# Patient Record
Sex: MALE | Race: WHITE | Employment: OTHER | URBAN - METROPOLITAN AREA
[De-identification: names, ages, dates, MRNs, and addresses within clinical notes are randomized per-mention and may not be internally consistent; named-entity substitution may affect disease eponyms.]

---

## 2017-08-29 PROBLEM — C44.319 BASAL CELL CARCINOMA OF RIGHT LATERAL CHEEK: Status: ACTIVE | Noted: 2017-08-29

## 2022-04-26 ENCOUNTER — HOSPITAL ENCOUNTER (OUTPATIENT)
Age: 87
Setting detail: SPECIMEN
Discharge: HOME | End: 2022-04-26

## 2022-04-26 ENCOUNTER — HOSPITAL ENCOUNTER (OUTPATIENT)
Dept: PREADMISSION TESTING | Age: 87
Discharge: HOME | End: 2022-04-30
Payer: MEDICARE

## 2022-04-26 VITALS
HEART RATE: 65 BPM | SYSTOLIC BLOOD PRESSURE: 137 MMHG | OXYGEN SATURATION: 94 % | DIASTOLIC BLOOD PRESSURE: 107 MMHG | TEMPERATURE: 97 F | RESPIRATION RATE: 18 BRPM

## 2022-04-26 DIAGNOSIS — Z01.818 PRE-OP TESTING: Primary | ICD-10-CM

## 2022-04-26 DIAGNOSIS — Z01.818 PRE-OP TESTING: ICD-10-CM

## 2022-04-26 PROBLEM — N18.30 STAGE 3 CHRONIC KIDNEY DISEASE (HCC): Status: ACTIVE | Noted: 2020-11-20

## 2022-04-26 PROBLEM — E13.9 LATENT AUTOIMMUNE DIABETES MELLITUS IN ADULT (LADA) (HCC): Status: ACTIVE | Noted: 2022-04-26

## 2022-04-26 PROBLEM — I27.20 PULMONARY HYPERTENSION (HCC): Status: ACTIVE | Noted: 2020-11-20

## 2022-04-26 PROBLEM — E78.2 MIXED HYPERLIPIDEMIA: Status: ACTIVE | Noted: 2020-11-20

## 2022-04-26 PROBLEM — Z95.2 HISTORY OF MITRAL VALVE REPLACEMENT: Status: ACTIVE | Noted: 2020-11-20

## 2022-04-26 PROBLEM — M19.90 OSTEOARTHROSIS: Status: ACTIVE | Noted: 2020-11-20

## 2022-04-26 PROBLEM — F17.200 NICOTINE DEPENDENCE: Status: ACTIVE | Noted: 2020-11-20

## 2022-04-26 PROBLEM — I38 HEART VALVE DISEASE: Status: ACTIVE | Noted: 2017-12-08

## 2022-04-26 PROBLEM — I34.0 NONRHEUMATIC MITRAL VALVE INSUFFICIENCY: Status: ACTIVE | Noted: 2017-04-04

## 2022-04-26 PROBLEM — I50.30 DIASTOLIC HEART FAILURE (HCC): Status: ACTIVE | Noted: 2020-11-20

## 2022-04-26 PROBLEM — K21.9 GASTRO-ESOPHAGEAL REFLUX DISEASE WITHOUT ESOPHAGITIS: Status: ACTIVE | Noted: 2020-11-20

## 2022-04-26 PROBLEM — I48.19 PERSISTENT ATRIAL FIBRILLATION (HCC): Status: ACTIVE | Noted: 2020-11-20

## 2022-04-26 PROBLEM — E11.9 TYPE 2 DIABETES MELLITUS (HCC): Status: ACTIVE | Noted: 2017-03-14

## 2022-04-26 PROBLEM — B37.42 CANDIDAL BALANITIS: Status: ACTIVE | Noted: 2020-11-20

## 2022-04-26 PROBLEM — N40.0 ENLARGED PROSTATE: Status: ACTIVE | Noted: 2020-11-20

## 2022-04-26 PROBLEM — R06.02 SHORTNESS OF BREATH: Status: ACTIVE | Noted: 2017-03-14

## 2022-04-26 PROBLEM — E55.9 VITAMIN D DEFICIENCY: Status: ACTIVE | Noted: 2020-11-20

## 2022-04-26 PROBLEM — I65.29 CAROTID STENOSIS: Status: ACTIVE | Noted: 2018-03-12

## 2022-04-26 PROBLEM — I36.1 NON-RHEUMATIC TRICUSPID VALVE INSUFFICIENCY: Status: ACTIVE | Noted: 2017-03-14

## 2022-04-26 PROBLEM — J90 PLEURAL EFFUSION: Status: ACTIVE | Noted: 2020-04-13

## 2022-04-26 PROBLEM — D64.9 ANEMIA: Status: ACTIVE | Noted: 2020-11-20

## 2022-04-26 PROBLEM — I49.5 SINUS NODE DYSFUNCTION (HCC): Status: ACTIVE | Noted: 2017-02-06

## 2022-04-26 PROBLEM — G47.33 OBSTRUCTIVE SLEEP APNEA SYNDROME: Status: ACTIVE | Noted: 2020-11-20

## 2022-04-26 PROBLEM — J44.9 CHRONIC OBSTRUCTIVE PULMONARY DISEASE (HCC): Status: ACTIVE | Noted: 2017-03-14

## 2022-04-26 PROBLEM — E03.9 HYPOTHYROIDISM: Status: ACTIVE | Noted: 2020-11-20

## 2022-04-26 LAB
ANION GAP SERPL CALCULATED.3IONS-SCNC: 19 MMOL/L (ref 9–17)
BUN BLDV-MCNC: 61 MG/DL (ref 8–23)
CALCIUM SERPL-MCNC: 9.5 MG/DL (ref 8.6–10.4)
CHLORIDE BLD-SCNC: 99 MMOL/L (ref 98–107)
CO2: 22 MMOL/L (ref 20–31)
CREAT SERPL-MCNC: 2.83 MG/DL (ref 0.7–1.2)
GFR AFRICAN AMERICAN: 26 ML/MIN
GFR NON-AFRICAN AMERICAN: 21 ML/MIN
GFR SERPL CREATININE-BSD FRML MDRD: ABNORMAL ML/MIN/{1.73_M2}
GLUCOSE BLD-MCNC: 105 MG/DL (ref 70–99)
HCT VFR BLD CALC: 34.5 % (ref 40.7–50.3)
HEMOGLOBIN: 11 G/DL (ref 13–17)
MCH RBC QN AUTO: 34.4 PG (ref 25.2–33.5)
MCHC RBC AUTO-ENTMCNC: 31.9 G/DL (ref 28.4–34.8)
MCV RBC AUTO: 107.8 FL (ref 82.6–102.9)
NRBC AUTOMATED: 0 PER 100 WBC
PDW BLD-RTO: 14.4 % (ref 11.8–14.4)
PLATELET # BLD: 177 K/UL (ref 138–453)
PMV BLD AUTO: 11.4 FL (ref 8.1–13.5)
POTASSIUM SERPL-SCNC: 4.3 MMOL/L (ref 3.7–5.3)
RBC # BLD: 3.2 M/UL (ref 4.21–5.77)
SODIUM BLD-SCNC: 140 MMOL/L (ref 135–144)
WBC # BLD: 7.5 K/UL (ref 3.5–11.3)

## 2022-04-26 PROCEDURE — 93005 ELECTROCARDIOGRAM TRACING: CPT | Performed by: ANESTHESIOLOGY

## 2022-04-27 LAB
EKG ATRIAL RATE: 66 BPM
EKG Q-T INTERVAL: 476 MS
EKG QRS DURATION: 190 MS
EKG QTC CALCULATION (BAZETT): 483 MS
EKG R AXIS: -96 DEGREES
EKG T AXIS: 77 DEGREES
EKG VENTRICULAR RATE: 62 BPM

## 2022-06-08 ENCOUNTER — ANESTHESIA EVENT (OUTPATIENT)
Dept: OPERATING ROOM | Age: 87
End: 2022-06-08
Payer: MEDICARE

## 2022-06-08 NOTE — PRE-PROCEDURE INSTRUCTIONS
PAT phone call completed with pt. Pt seems confused about names of medications and very [place]. Communication difficult over the phone with pt. Date/time/location of surgery verified with pt. NPO after [place] status verified with pt. Need for  (son to drop off and sister to )  verified with pt. Instructed pt to take BP meds (metoprolol) with a small sip of water prior to procedure/surgery. Pt verbalized understanding to hold Coumadin starting 6/11/22. Pt has Cardiology/Pacemaker interrogation scheduled for this week.

## 2022-06-14 ENCOUNTER — HOSPITAL ENCOUNTER (OUTPATIENT)
Age: 87
Setting detail: OUTPATIENT SURGERY
Discharge: HOME | End: 2022-06-14
Attending: PLASTIC SURGERY | Admitting: PLASTIC SURGERY
Payer: MEDICARE

## 2022-06-14 ENCOUNTER — ANESTHESIA (OUTPATIENT)
Dept: OPERATING ROOM | Age: 87
End: 2022-06-14
Payer: MEDICARE

## 2022-06-14 VITALS
RESPIRATION RATE: 17 BRPM | WEIGHT: 152 LBS | BODY MASS INDEX: 21.28 KG/M2 | HEART RATE: 69 BPM | OXYGEN SATURATION: 96 % | SYSTOLIC BLOOD PRESSURE: 123 MMHG | TEMPERATURE: 97.4 F | DIASTOLIC BLOOD PRESSURE: 97 MMHG | HEIGHT: 71 IN

## 2022-06-14 DIAGNOSIS — D48.5 NEOPLASM OF UNCERTAIN BEHAVIOR OF SKIN: ICD-10-CM

## 2022-06-14 PROCEDURE — 3600000012 HC SURGERY LEVEL 2 ADDTL 15MIN: Performed by: PLASTIC SURGERY

## 2022-06-14 PROCEDURE — 3600000002 HC SURGERY LEVEL 2 BASE: Performed by: PLASTIC SURGERY

## 2022-06-14 PROCEDURE — 7100000011 HC PHASE II RECOVERY - ADDTL 15 MIN: Performed by: PLASTIC SURGERY

## 2022-06-14 PROCEDURE — 2709999900 HC NON-CHARGEABLE SUPPLY: Performed by: PLASTIC SURGERY

## 2022-06-14 PROCEDURE — 6360000002 HC RX W HCPCS: Performed by: SPECIALIST

## 2022-06-14 PROCEDURE — 88305 TISSUE EXAM BY PATHOLOGIST: CPT

## 2022-06-14 PROCEDURE — 3700000001 HC ADD 15 MINUTES (ANESTHESIA): Performed by: PLASTIC SURGERY

## 2022-06-14 PROCEDURE — 7100000010 HC PHASE II RECOVERY - FIRST 15 MIN: Performed by: PLASTIC SURGERY

## 2022-06-14 PROCEDURE — 3700000000 HC ANESTHESIA ATTENDED CARE: Performed by: PLASTIC SURGERY

## 2022-06-14 PROCEDURE — 2580000003 HC RX 258: Performed by: ANESTHESIOLOGY

## 2022-06-14 PROCEDURE — 2500000003 HC RX 250 WO HCPCS: Performed by: PLASTIC SURGERY

## 2022-06-14 RX ORDER — SODIUM CHLORIDE 9 MG/ML
25 INJECTION, SOLUTION INTRAVENOUS PRN
Status: DISCONTINUED | OUTPATIENT
Start: 2022-06-14 | End: 2022-06-14 | Stop reason: HOSPADM

## 2022-06-14 RX ORDER — CEFAZOLIN SODIUM 2 G/50ML
SOLUTION INTRAVENOUS PRN
Status: DISCONTINUED | OUTPATIENT
Start: 2022-06-14 | End: 2022-06-14 | Stop reason: SDUPTHER

## 2022-06-14 RX ORDER — SODIUM CHLORIDE 0.9 % (FLUSH) 0.9 %
5-40 SYRINGE (ML) INJECTION EVERY 12 HOURS SCHEDULED
Status: DISCONTINUED | OUTPATIENT
Start: 2022-06-14 | End: 2022-06-14 | Stop reason: HOSPADM

## 2022-06-14 RX ORDER — CEPHALEXIN 500 MG/1
500 CAPSULE ORAL 3 TIMES DAILY
Qty: 15 CAPSULE | Refills: 0 | Status: SHIPPED | OUTPATIENT
Start: 2022-06-14 | End: 2022-06-19

## 2022-06-14 RX ORDER — SODIUM CHLORIDE, SODIUM LACTATE, POTASSIUM CHLORIDE, CALCIUM CHLORIDE 600; 310; 30; 20 MG/100ML; MG/100ML; MG/100ML; MG/100ML
INJECTION, SOLUTION INTRAVENOUS CONTINUOUS
Status: DISCONTINUED | OUTPATIENT
Start: 2022-06-14 | End: 2022-06-14 | Stop reason: HOSPADM

## 2022-06-14 RX ORDER — SODIUM CHLORIDE 0.9 % (FLUSH) 0.9 %
5-40 SYRINGE (ML) INJECTION PRN
Status: DISCONTINUED | OUTPATIENT
Start: 2022-06-14 | End: 2022-06-14 | Stop reason: HOSPADM

## 2022-06-14 RX ORDER — SODIUM CHLORIDE 9 MG/ML
INJECTION, SOLUTION INTRAVENOUS CONTINUOUS
Status: DISCONTINUED | OUTPATIENT
Start: 2022-06-14 | End: 2022-06-14 | Stop reason: HOSPADM

## 2022-06-14 RX ORDER — BUPIVACAINE HYDROCHLORIDE AND EPINEPHRINE 5; 5 MG/ML; UG/ML
INJECTION, SOLUTION EPIDURAL; INTRACAUDAL; PERINEURAL PRN
Status: DISCONTINUED | OUTPATIENT
Start: 2022-06-14 | End: 2022-06-14 | Stop reason: ALTCHOICE

## 2022-06-14 RX ORDER — DIPHENHYDRAMINE HYDROCHLORIDE 50 MG/ML
12.5 INJECTION INTRAMUSCULAR; INTRAVENOUS
Status: DISCONTINUED | OUTPATIENT
Start: 2022-06-14 | End: 2022-06-14 | Stop reason: HOSPADM

## 2022-06-14 RX ORDER — FENTANYL CITRATE 50 UG/ML
INJECTION, SOLUTION INTRAMUSCULAR; INTRAVENOUS PRN
Status: DISCONTINUED | OUTPATIENT
Start: 2022-06-14 | End: 2022-06-14 | Stop reason: SDUPTHER

## 2022-06-14 RX ORDER — MORPHINE SULFATE 1 MG/ML
1 INJECTION, SOLUTION EPIDURAL; INTRATHECAL; INTRAVENOUS EVERY 5 MIN PRN
Status: DISCONTINUED | OUTPATIENT
Start: 2022-06-14 | End: 2022-06-14 | Stop reason: HOSPADM

## 2022-06-14 RX ORDER — ONDANSETRON 2 MG/ML
4 INJECTION INTRAMUSCULAR; INTRAVENOUS
Status: DISCONTINUED | OUTPATIENT
Start: 2022-06-14 | End: 2022-06-14 | Stop reason: HOSPADM

## 2022-06-14 RX ORDER — PROPOFOL 10 MG/ML
INJECTION, EMULSION INTRAVENOUS PRN
Status: DISCONTINUED | OUTPATIENT
Start: 2022-06-14 | End: 2022-06-14 | Stop reason: SDUPTHER

## 2022-06-14 RX ORDER — SODIUM CHLORIDE 9 MG/ML
INJECTION, SOLUTION INTRAVENOUS PRN
Status: DISCONTINUED | OUTPATIENT
Start: 2022-06-14 | End: 2022-06-14 | Stop reason: HOSPADM

## 2022-06-14 RX ADMIN — PROPOFOL 30 MG: 10 INJECTION, EMULSION INTRAVENOUS at 08:52

## 2022-06-14 RX ADMIN — CEFAZOLIN SODIUM 2000 MG: 2 SOLUTION INTRAVENOUS at 08:54

## 2022-06-14 RX ADMIN — SODIUM CHLORIDE: 9 INJECTION, SOLUTION INTRAVENOUS at 07:47

## 2022-06-14 RX ADMIN — FENTANYL CITRATE 25 MCG: 50 INJECTION, SOLUTION INTRAMUSCULAR; INTRAVENOUS at 08:49

## 2022-06-14 ASSESSMENT — COPD QUESTIONNAIRES: CAT_SEVERITY: NO INTERVAL CHANGE

## 2022-06-14 ASSESSMENT — ENCOUNTER SYMPTOMS: SHORTNESS OF BREATH: 1

## 2022-06-14 ASSESSMENT — PAIN SCALES - GENERAL
PAINLEVEL_OUTOF10: 0
PAINLEVEL_OUTOF10: 0

## 2022-06-14 ASSESSMENT — PAIN - FUNCTIONAL ASSESSMENT: PAIN_FUNCTIONAL_ASSESSMENT: 0-10

## 2022-06-14 NOTE — H&P
HPI: Pt is a 80 y.o. male who presents to the office today for a lesion on the dorsal right hand. He states that it has been present for a couple months and has become larger, more exophytic, pruritic and now has a waxy center. He states that it is painful at times and bleeds. He denies a history of skin cancer. His wife passed away three months ago and has been staying with his son in [place]. He noticed it was getting worse while he was there. 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      Past Medical History        Past Medical History:   Diagnosis Date    Anemia      Asthma      Atrial fibrillation [facility]      CAD (coronary artery disease)      Cancer ([facility] 08/18/2017     Right Srinivas Aaron Brain    COPD (chronic obstructive pulmonary disease) ([facility]      Diabetes ([facility]      [place] (hard of hearing)      Hypertension      Hypothyroidism      ERIC (obstructive sleep apnea)      Oxygen deficit       pt wears 2 liters of o2 at night.  and as needed            Past Surgical History         Past Surgical History:   Procedure Laterality Date    CARDIAC CATHETERIZATION   12/2018    CARDIAC VALVE SURGERY   02/2018     mitral valve replaced    COLONOSCOPY        HERNIA REPAIR        MALIGNANT SKIN LESION EXCISION Right 08/18/2017     BCC Right Cheek- Dr. Montiel [place]        PACEMAKER PLACEMENT        REVISION TOTAL KNEE ARTHROPLASTY        TONSILLECTOMY        TRICUSPID VALVE SURGERY         repair            Current Facility-Administered Medications          Current Outpatient Medications   Medication Sig Dispense Refill    cephALEXin (KEFLEX) 500 MG capsule          OXYGEN Inhale 2 L into the lungs as needed Per n/c        tamsulosin (FLOMAX) 0.4 MG capsule Take 0.4 mg by mouth daily        albuterol (PROVENTIL) 2.5 MG/0.5ML NEBU nebulizer solution Take 2.5 mg by nebulization every 6 hours as needed for Wheezing        tiotropium (SPIRIVA RESPIMAT) 1.25 MCG/ACT AERS inhaler Inhale 2 puffs into the lungs daily        torsemide (DEMADEX) 20 MG tablet Take 20 mg by mouth 2 times daily         Coenzyme Q10 (CO Q-10) 100 MG CAPS Take by mouth        Multiple Vitamins-Minerals (THERAPEUTIC MULTIVITAMIN-MINERALS) tablet Take 1 tablet by mouth daily        psyllium (KONSYL) 28.3 % PACK Take 1 packet by mouth daily        B Complex-C (SUPER B COMPLEX PO) Take by mouth        Cyanocobalamin (VITAMIN B 12) 500 MCG TABS Take 2,500 mg by mouth daily        levothyroxine (SYNTHROID) 125 MCG tablet          metoprolol tartrate (LOPRESSOR) 25 MG tablet Take 50 mg by mouth 2 times daily         omeprazole (PRILOSEC) 20 MG delayed release capsule          simvastatin (ZOCOR) 10 MG tablet          terazosin (HYTRIN) 2 MG capsule          warfarin (COUMADIN) 2 MG tablet          warfarin (COUMADIN) 5 MG tablet            No current facility-administered medications for this visit.            Family History         Family History   Problem Relation Age of Onset    Cancer Mother      No Known Problems Father              Social History               Socioeconomic History    Marital status:        Spouse name: Not on file    Number of children: Not on file    Years of education: Not on file    Highest education level: Not on file   Occupational History    Not on file   Tobacco Use    Smoking status: Never Smoker    Smokeless tobacco: Never Used   Vaping Use    Vaping Use: Never used   Substance and Sexual Activity    Alcohol use: Not Currently    Drug use: No    Sexual activity: Not on file   Other Topics Concern    Not on file   Social History Narrative    Not on file      Social Determinants of Health          Financial Resource Strain:     Difficulty of Paying Living Expenses: Not on file   Food Insecurity:     Worried About Running Out of Food in the Last Year: Not on file    [place] of Food in the Last Year: Not on file   Transportation Needs:     Lack of Transportation (Medical): Not on file    Lack of Transportation (Non-Medical): Not on file   Physical Activity:     Days of Exercise per Week: Not on file    Minutes of Exercise per Session: Not on file   Stress:     Feeling of Stress : Not on file   Social Connections:     Frequency of Communication with Friends and Family: Not on file    Frequency of Social Gatherings with Friends and Family: Not on file    Attends Druze Services: Not on file    Active Member of [address] [place] [address] or Organizations: Not on file    Attends Club or Organization Meetings: Not on file    Marital Status: Not on file   Intimate Partner Violence:     Fear of Current or Ex-Partner: Not on file    Emotionally Abused: Not on file    Physically Abused: Not on file    Sexually Abused: Not on file   Housing Stability:     Unable to Pay for Housing in the Last Year: Not on file    Number of [place] in the Last Year: Not on file    Unstable Housing in the Last Year: Not on file               ROS:  All systems reviewed. Denies chest pain, shortness of breath, abdominal pain, n/v/d/c. Denies rashes, any upper respiratory illness or fever / chills. Denies dizziness, headaches, tremor. Negative other than those noted above.           Allergies   Allergen Reactions    Atorvastatin Other (See Comments)       Other reaction(s): muscle cramps, Other: See Comments    Penicillins Rash    Entex T [Pseudoephedrine-Guaifenesin]           Physical Exam:  VITALS:  height is 5' 11\" (1.803 m) and weight is 180 lb (81.6 kg). His respiration is 16. CONSTITUTIONAL:alert & orientated x 3, no acute distress   SKIN:  Warm and dry, no rashes. 1.2 cm dome shaped, exophytic lesion to the dorsal right hand. See picture in media. See assessment below  HEAD:  Normocephalic, atraumatic  EYES: PERRL. EOMs intact. EARS:  Hearing grossly WNL. NOSE:  Nares patent.   No rhinorrhea  THROAT:  benign  NECK:supple, no lymphadenopathy  LUNGS: No audible adventitious lung sounds. Patient is breathing normally without issues speaking in full sentences. CARDIOVASCULAR: Heart sounds are normal. Pulses equal bilaterally. Skin color appropriate throughout. Regular rate and rhythm without murmur, gallop or rub. ABDOMEN: soft, non tender, non distended, no masses or organomegaly   EXTREMITIES: no edema bilateral lower extremities         Assessment:  1.     Diagnosis Orders   1. Neoplasm of uncertain behavior of skin of hand            Plan:  1. At this time we will get clearance from his PCP due to recent \"issues with urine\" per patient. We will schedule patient when cleared. 2. Self skin checks encouraged, patient advised to watch all existing lesions for changes and observe for any new lesions. Return to the office for evaluation of any concerning, changing or new skin lesions. 3.  Consistent use of sunscreen containing SPF 30 or higher and reapplication every 90 - 050 minutes while outside. 4.  The patient's was evaluated and after discussion surgical and non surgical options, the patient has decided to undergo surgical removal of the lesion. All questions were answered to the patient's satisfaction. We will get him scheduled for excision under MAC anesthesia. The patient will call Kriss to schedule the procedure. If there are any further questions or concerns, the patient was encouraged to call and follow up with one of the providers. H & P reviewed.  The Patient was examined and there are no changes to the H & P

## 2022-06-14 NOTE — OP NOTE
Operative Note      Patient: Nessa Morgan  YOB: 1934  MRN: [id number]    Date of Procedure: 6/14/2022    Pre-Op Diagnosis: D48.5  LESION RIGHT HAND    Post-Op Diagnosis: Same       Procedure(s):  HAND LESION BIOPSY EXCISION    Surgeon(s):  Peterson Yancey MD    Assistant:   * No surgical staff found *    Anesthesia: Monitor Anesthesia Care    Estimated Blood Loss (mL): Minimal    Complications: None    Specimens:   ID Type Source Tests Collected by Time Destination   A : LESION RIGHT HAND Tissue Lesion SURGICAL PATHOLOGY Dimitri Hampton RN 6/14/2022 0901        Implants:  * No implants in log *      Drains: * No LDAs found *    Findings: 22 mm dorsum right hand nodular lesion    Detailed Description of Procedure: With patient by intravenous sedation administered the areas anesthetized with local anesthetic 1/2% Marcaine 1-200,000 epinephrine local.  After sterile prep and drape and timeout oblique elliptical excision of this was carried out with Bovie electrocautery hemostasis. Interrupted and running 3-0 Prolene sutures were used to approximate skin with minimal bleeding. Sterile dressings were applied. He went to recovery in excellent condition.     Electronically signed by Peterson Yancey MD on 6/14/2022 at 9:16 AM

## 2022-06-14 NOTE — ANESTHESIA PRE PROCEDURE
Department of Anesthesiology  Preprocedure Note       Name:  Viktoriya Tyson   Age:  80 y.o.  :  1934                                          MRN:  [id number]         Date:  2022      Surgeon: Ros Bergman):  Toya Clemons MD    Procedure: Procedure(s):  HAND LESION BIOPSY EXCISION    Medications prior to admission:   Prior to Admission medications    Medication Sig Start Date End Date Taking?  Authorizing Provider   cephALEXin (KEFLEX) 500 MG capsule  22   Historical Provider, MD   OXYGEN Inhale 2 L into the lungs as needed Per n/c    Historical Provider, MD   tamsulosin (FLOMAX) 0.4 MG capsule Take 0.4 mg by mouth daily    Historical Provider, MD   albuterol (PROVENTIL) 2.5 MG/0.5ML NEBU nebulizer solution Take 2.5 mg by nebulization every 6 hours as needed for Wheezing    Historical Provider, MD   tiotropium (SPIRIVA RESPIMAT) 1.25 MCG/ACT AERS inhaler Inhale 2 puffs into the lungs daily    Historical Provider, MD   torsemide (DEMADEX) 20 MG tablet Take 20 mg by mouth 2 times daily     Historical Provider, MD   Coenzyme Q10 (CO Q-10) 100 MG CAPS Take by mouth    Historical Provider, MD   Multiple Vitamins-Minerals (THERAPEUTIC MULTIVITAMIN-MINERALS) tablet Take 1 tablet by mouth daily    Historical Provider, MD   psyllium (KONSYL) 28.3 % PACK Take 1 packet by mouth daily    Historical Provider, MD   B Complex-C (SUPER B COMPLEX PO) Take by mouth    Historical Provider, MD   Cyanocobalamin (VITAMIN B 12) 500 MCG TABS Take 2,500 mg by mouth daily    Historical Provider, MD   levothyroxine (SYNTHROID) 125 MCG tablet  17   Historical Provider, MD   metoprolol tartrate (LOPRESSOR) 25 MG tablet Take 50 mg by mouth 2 times daily  17   Historical Provider, MD   omeprazole (PRILOSEC) 20 MG delayed release capsule  17   Historical Provider, MD   simvastatin (ZOCOR) 10 MG tablet  17   Historical Provider, MD   terazosin (HYTRIN) 2 MG capsule  17   Historical Provider, MD   warfarin (COUMADIN) 2 MG tablet  7/5/17   Historical Provider, MD   warfarin (COUMADIN) 5 MG tablet  7/5/17   Historical Provider, MD       Current medications:    Current Facility-Administered Medications   Medication Dose Route Frequency Provider Last Rate Last Admin    0.9 % sodium chloride infusion   IntraVENous Continuous Moses Powell MD        lactated ringers infusion   IntraVENous Continuous Moses Powell MD        sodium chloride flush 0.9 % injection 5-40 mL  5-40 mL IntraVENous 2 times per day Moses Powell MD        sodium chloride flush 0.9 % injection 5-40 mL  5-40 mL IntraVENous PRN Moses Powell MD        0.9 % sodium chloride infusion   IntraVENous PRN Moses Powell MD           Allergies:     Allergies   Allergen Reactions    Atorvastatin Other (See Comments)     Other reaction(s): muscle cramps, Other: See Comments    Penicillins Rash    Entex T [Pseudoephedrine-Guaifenesin]        Problem List:    Patient Active Problem List   Diagnosis Code    Basal cell carcinoma of right lateral cheek C44.319    Anemia D64.9    Candidal balanitis B37.42    Carotid stenosis I65.29    Chronic obstructive pulmonary disease ([facility] Q12.0    Diastolic heart failure (HCC) I50.30    Enlarged prostate N40.0    Gastro-esophageal reflux disease without esophagitis K21.9    History of mitral valve replacement Z95.2    Hypertension I10    Hypothyroidism E03.9    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) ([facility] E13.9    Mixed hyperlipidemia E78.2    Nicotine dependence F17.200    Nonrheumatic mitral valve insufficiency I34.0    Non-rheumatic tricuspid valve insufficiency I36.1    Obstructive sleep apnea syndrome G47.33    Osteoarthrosis M19.90    Persistent atrial fibrillation (HCC) I48.19    Pleural effusion J90    Heart valve disease I38    Pulmonary hypertension (HCC) I27.20    Shortness of breath R06.02    Sinus node dysfunction (HCC) I49.5    Stage 3 chronic kidney disease (HCC) N18.30    Type 2 diabetes mellitus ([facility] E11.9    Vitamin D deficiency E55.9       Past Medical History:        Diagnosis Date    Anemia     Asthma     Atrial fibrillation (HCC)     CAD (coronary artery disease)     Cancer ([facility] 08/18/2017    Right Cheek BCC- Dr. Camila Guillaume COPD (chronic obstructive pulmonary disease) ([facility]     Diabetes ([facility]     [place] (hard of hearing)     Hypertension     Hypothyroidism     ERIC (obstructive sleep apnea)     Oxygen deficit     pt wears 2 liters of o2 at night. and as needed       Past Surgical History:        Procedure Laterality Date    CARDIAC CATHETERIZATION  12/2018   [facility]ARDIAC VALVE SURGERY  02/2018    mitral valve replaced    COLONOSCOPY      HERNIA REPAIR      MALIGNANT SKIN LESION EXCISION Right 08/18/2017    BCC Right Cheek- Dr. Norma Ordoñez      REVISION TOTAL KNEE ARTHROPLASTY      TONSILLECTOMY      TRICUSPID VALVE SURGERY      repair       Social History:    Social History     Tobacco Use    Smoking status: Never Smoker    Smokeless tobacco: Never Used   Substance Use Topics    Alcohol use: Not Currently                                Counseling given: Not Answered      Vital Signs (Current): There were no vitals filed for this visit.                                            BP Readings from Last 3 Encounters:   05/12/22 124/60   04/26/22 (!) 137/107   11/11/21 112/60       NPO Status:                                                                                 BMI:   Wt Readings from Last 3 Encounters:   05/12/22 147 lb 3.2 oz (66.8 kg)   04/26/22 180 lb (81.6 kg)   11/11/21 159 lb 12.8 oz (72.5 kg)     There is no height or weight on file to calculate BMI.    CBC:   Lab Results   Component Value Date    WBC 7.5 04/26/2022    RBC 3.20 04/26/2022    HGB 11.0 04/26/2022    HCT 34.5 04/26/2022    .8 04/26/2022    RDW 14.4 04/26/2022     04/26/2022       CMP:   Lab Results   Component Value Date     04/26/2022    K 4.3 04/26/2022    CL 99 04/26/2022    CO2 22 04/26/2022    BUN 61 04/26/2022    CREATININE 2.83 04/26/2022    GFRAA 26 04/26/2022    LABGLOM 21 04/26/2022    GLUCOSE 105 04/26/2022    CALCIUM 9.5 04/26/2022       POC Tests: No results for input(s): POCGLU, POCNA, POCK, POCCL, POCBUN, POCHEMO, POCHCT in the last 72 hours. Coags: No results found for: PROTIME, INR, APTT    HCG (If Applicable): No results found for: PREGTESTUR, PREGSERUM, HCG, HCGQUANT     ABGs: No results found for: PHART, PO2ART, NYL4CCX, DDQ1HQK, BEART, Y7IUPXHH     Type & Screen (If Applicable):  No results found for: LABABO, LABRH    Drug/Infectious Status (If Applicable):  No results found for: HIV, HEPCAB    COVID-19 Screening (If Applicable): No results found for: COVID19        Anesthesia Evaluation  Patient summary reviewed and Nursing notes reviewed no history of anesthetic complications:   Airway: Mallampati: II  TM distance: >3 FB   Neck ROM: full  Mouth opening: > = 3 FB   Dental:          Pulmonary:normal exam  breath sounds clear to auscultation  (+) COPD: no interval change,  shortness of breath:  sleep apnea:  asthma:                           ROS comment: Oxygen deficit pt wears 2 liters of o2 at night. and as needed    Cardiovascular:  Exercise tolerance: no interval change,   (+) hypertension: no interval change, pacemaker: pacemaker and no interval change, CAD: no interval change, dysrhythmias: atrial fibrillation, CHF: diastolic and no interval change, pulmonary hypertension: no interval change, hyperlipidemia      ECG reviewed  Rhythm: irregular  Rate: abnormal                 ROS comment: History of mitral valve replacement     Neuro/Psych:   Negative Neuro/Psych ROS              GI/Hepatic/Renal:   (+) GERD:,           Endo/Other:    (+) DiabetesType II DM, no interval change, , hypothyroidism: arthritis: OA and no interval change. , malignancy/cancer.                   ROS comment: LESION RIGHT HAND Abdominal:       Abdomen: soft. Vascular:   + PVD, aortic or cerebral, . ROS comment: Carotid stenosis. Other Findings:           Anesthesia Plan      MAC and general     ASA 4             Anesthetic plan and risks discussed with patient. Plan discussed with CRNA.                     Chanel Morataya MD   6/14/2022

## 2022-06-14 NOTE — ANESTHESIA POSTPROCEDURE EVALUATION
POST- ANESTHESIA EVALUATION       Pt Name: Haylee Shrestha  MRN: [id number]  [place]: 1934  Date of evaluation: 6/14/2022  Time:  10:29 AM      BP (!) 123/97   Pulse 69   Temp 97.4 °F (36.3 °C) (Temporal)   Resp 17   Ht 5' 11\" (1.803 m)   Wt 152 lb (68.9 kg)   SpO2 96%   BMI 21.20 kg/m²      Consciousness Level  Awake  Cardiopulmonary Status  Stable  Pain Adequately Treated YES  Nausea / Vomiting  NO  Adequate Hydration  YES  Anesthesia Related Complications NONE      Electronically signed by Rima Cody MD on 6/14/2022 at 10:29 AM       Department of Anesthesiology  Postprocedure Note    Patient: Haylee Shrestha  MRN: [id number]  [place]: 1934  Date of evaluation: 6/14/2022  Time:  10:29 AM     Procedure Summary     Date: 06/14/22 Room / Location: [address]    Anesthesia Start: 0848 Anesthesia Stop: 0798    Procedure: HAND LESION BIOPSY EXCISION (Right Hand) Diagnosis:       Neoplasm of uncertain behavior of skin      (D48.5  LESION RIGHT HAND)    Surgeons: Nanda Pemberton MD Responsible Provider: Rima Cody MD    Anesthesia Type: MAC, general ASA Status: 4          Anesthesia Type: No value filed. Torres Phase I: Torres Score: 10    Torres Phase II: Torres Score: 9    Last vitals: Reviewed and per EMR flowsheets.        Anesthesia Post Evaluation

## 2022-06-15 LAB — DERMATOLOGY PATHOLOGY REPORT: NORMAL

## 2022-11-29 PROBLEM — R07.1 CHEST PAIN ON BREATHING: Status: ACTIVE | Noted: 2022-11-29

## 2022-11-29 PROBLEM — J94.2 HEMOTHORAX: Status: ACTIVE | Noted: 2022-11-29

## 2022-11-29 PROBLEM — I38 VALVULAR HEART DISEASE: Status: ACTIVE | Noted: 2022-11-29

## 2022-11-29 PROBLEM — R14.2 FLATULENCE, ERUCTATION AND GAS PAIN: Status: ACTIVE | Noted: 2022-11-29

## 2022-11-29 PROBLEM — E87.70 FLUID OVERLOAD: Status: ACTIVE | Noted: 2022-11-29

## 2022-11-29 PROBLEM — I50.33 ACUTE ON CHRONIC DIASTOLIC HEART FAILURE (HCC): Status: ACTIVE | Noted: 2022-11-29

## 2022-11-29 PROBLEM — R35.0 URINARY FREQUENCY: Status: ACTIVE | Noted: 2022-11-29

## 2022-11-29 PROBLEM — M13.0 POLYARTHRITIS: Status: ACTIVE | Noted: 2022-11-29

## 2022-11-29 PROBLEM — R30.0 DYSURIA: Status: ACTIVE | Noted: 2022-11-29
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
## 2022-11-29 PROBLEM — I10 ESSENTIAL HYPERTENSION: Status: ACTIVE | Noted: 2022-11-29

## 2022-11-29 PROBLEM — J44.89 COPD WITH ASTHMA: Status: ACTIVE | Noted: 2022-11-29

## 2022-11-29 PROBLEM — N18.9 ANEMIA IN CHRONIC KIDNEY DISEASE: Status: ACTIVE | Noted: 2022-11-29

## 2022-11-29 PROBLEM — Z95.0 PRESENCE OF CARDIAC PACEMAKER: Status: ACTIVE | Noted: 2017-02-17

## 2022-11-29 PROBLEM — E11.649 HYPOGLYCEMIA DUE TO TYPE 2 DIABETES MELLITUS (HCC): Status: ACTIVE | Noted: 2022-11-29

## 2022-11-29 PROBLEM — I50.21 ACUTE SYSTOLIC HEART FAILURE (HCC): Status: ACTIVE | Noted: 2022-11-29

## 2022-11-29 PROBLEM — R14.1 FLATULENCE, ERUCTATION AND GAS PAIN: Status: ACTIVE | Noted: 2022-11-29

## 2022-11-29 PROBLEM — I95.9 HYPOTENSION: Status: ACTIVE | Noted: 2022-11-29

## 2022-11-29 PROBLEM — G47.30 SLEEP APNEA: Status: ACTIVE | Noted: 2022-11-29

## 2022-11-29 PROBLEM — R06.00 DYSPNEA: Status: ACTIVE | Noted: 2022-11-29

## 2022-11-29 PROBLEM — N18.9 CHRONIC RENAL FAILURE SYNDROME: Status: ACTIVE | Noted: 2022-11-29

## 2022-11-29 PROBLEM — F41.9 ANXIETY: Status: ACTIVE | Noted: 2022-11-29

## 2022-11-29 PROBLEM — I48.91 ATRIAL FIBRILLATION (HCC): Status: ACTIVE | Noted: 2022-11-29

## 2022-11-29 PROBLEM — D35.02 BENIGN NEOPLASM OF LEFT ADRENAL GLAND: Status: ACTIVE | Noted: 2022-11-29

## 2022-11-29 PROBLEM — D04.62 SQUAMOUS CELL CARCINOMA IN SITU (SCCIS) OF DORSUM OF LEFT HAND: Status: ACTIVE | Noted: 2022-11-29

## 2022-11-29 PROBLEM — R59.1 LYMPHADENOPATHY: Status: ACTIVE | Noted: 2022-11-29

## 2022-11-29 PROBLEM — R19.4 CHANGE IN BOWEL HABIT: Status: ACTIVE | Noted: 2022-11-29

## 2022-11-29 PROBLEM — Z95.2 PRESENCE OF PROSTHETIC HEART VALVE: Status: ACTIVE | Noted: 2022-11-29

## 2022-11-29 PROBLEM — R06.09 DYSPNEA ON EXERTION: Status: ACTIVE | Noted: 2022-11-29

## 2022-11-29 PROBLEM — R09.02 HYPOXEMIA: Status: ACTIVE | Noted: 2022-11-29

## 2022-11-29 PROBLEM — R13.10 DYSPHAGIA: Status: ACTIVE | Noted: 2022-11-29

## 2022-11-29 PROBLEM — E03.9 ACQUIRED HYPOTHYROIDISM: Status: ACTIVE | Noted: 2022-11-29

## 2022-11-29 PROBLEM — N18.32 STAGE 3B CHRONIC KIDNEY DISEASE (HCC): Status: ACTIVE | Noted: 2022-11-29

## 2022-11-29 PROBLEM — R53.83 FATIGUE: Status: ACTIVE | Noted: 2022-11-29

## 2022-11-29 PROBLEM — R10.13 EPIGASTRIC PAIN: Status: ACTIVE | Noted: 2022-11-29

## 2022-11-29 PROBLEM — J44.1 ACUTE EXACERBATION OF CHRONIC OBSTRUCTIVE AIRWAYS DISEASE (HCC): Status: ACTIVE | Noted: 2022-11-29

## 2022-11-29 PROBLEM — Z99.89 DEPENDENCE ON OTHER ENABLING MACHINES AND DEVICES: Status: ACTIVE | Noted: 2022-11-29

## 2022-11-29 PROBLEM — E11.21 DIABETIC RENAL DISEASE (HCC): Status: ACTIVE | Noted: 2022-11-29

## 2022-11-29 PROBLEM — R06.89 CHRONIC RESPIRATORY INSUFFICIENCY: Status: ACTIVE | Noted: 2022-11-29

## 2022-11-29 PROBLEM — N48.1 BALANITIS: Status: ACTIVE | Noted: 2022-11-29

## 2022-11-29 PROBLEM — J45.40 MODERATE PERSISTENT ASTHMA WITHOUT COMPLICATION: Status: ACTIVE | Noted: 2022-11-29

## 2022-11-29 PROBLEM — G47.33 OBSTRUCTIVE SLEEP APNEA (ADULT) (PEDIATRIC): Status: ACTIVE | Noted: 2022-11-29

## 2022-11-29 PROBLEM — I27.20 MILD PULMONARY HYPERTENSION (HCC): Status: ACTIVE | Noted: 2022-11-29

## 2022-11-29 PROBLEM — I47.20 VENTRICULAR TACHYCARDIA (HCC): Status: ACTIVE | Noted: 2020-11-20

## 2022-11-29 PROBLEM — J45.21 EXACERBATION OF INTERMITTENT ASTHMA: Status: ACTIVE | Noted: 2022-11-29

## 2022-11-29 PROBLEM — D12.6 BENIGN NEOPLASM OF COLON: Status: ACTIVE | Noted: 2022-11-29

## 2022-11-29 PROBLEM — R14.3 FLATULENCE, ERUCTATION AND GAS PAIN: Status: ACTIVE | Noted: 2022-11-29

## 2022-11-29 PROBLEM — I50.9 HEART FAILURE (HCC): Status: ACTIVE | Noted: 2022-11-29

## 2022-11-29 PROBLEM — D63.1 ANEMIA IN CHRONIC KIDNEY DISEASE: Status: ACTIVE | Noted: 2022-11-29

## 2022-11-29 PROBLEM — E03.3 POST-INFECTIOUS HYPOTHYROIDISM: Status: ACTIVE | Noted: 2022-11-29

## 2022-11-29 PROBLEM — M75.102 ROTATOR CUFF SYNDROME OF LEFT SHOULDER: Status: ACTIVE | Noted: 2022-11-29

## 2022-11-29 PROBLEM — E83.42 HYPOMAGNESEMIA: Status: ACTIVE | Noted: 2022-11-29

## 2022-11-29 PROBLEM — F32.1 MODERATE MAJOR DEPRESSION, SINGLE EPISODE (HCC): Status: ACTIVE | Noted: 2022-11-29

## 2022-11-29 PROBLEM — I48.20 CHRONIC ATRIAL FIBRILLATION (HCC): Status: ACTIVE | Noted: 2022-11-29

## 2022-11-29 PROBLEM — I48.0 PAROXYSMAL ATRIAL FIBRILLATION (HCC): Status: ACTIVE | Noted: 2022-11-29

## 2022-11-29 PROBLEM — E53.9 VITAMIN B DEFICIENCY: Status: ACTIVE | Noted: 2022-11-29

## 2022-11-29 PROBLEM — R07.81 PLEURODYNIA: Status: ACTIVE | Noted: 2022-11-29

## 2022-11-29 PROBLEM — J44.9 COPD WITH ASTHMA (HCC): Status: ACTIVE | Noted: 2022-11-29

## 2023-04-20 ENCOUNTER — HOSPITAL ENCOUNTER (OUTPATIENT)
Dept: PREADMISSION TESTING | Age: 88
Discharge: HOME | End: 2023-04-24
Payer: MEDICARE

## 2023-04-20 ENCOUNTER — HOSPITAL ENCOUNTER (OUTPATIENT)
Age: 88
Setting detail: SPECIMEN
Discharge: HOME | End: 2023-04-20

## 2023-04-20 VITALS
WEIGHT: 155 LBS | HEART RATE: 91 BPM | SYSTOLIC BLOOD PRESSURE: 126 MMHG | OXYGEN SATURATION: 96 % | DIASTOLIC BLOOD PRESSURE: 53 MMHG | BODY MASS INDEX: 22.19 KG/M2 | RESPIRATION RATE: 22 BRPM | HEIGHT: 70 IN | TEMPERATURE: 97.9 F

## 2023-04-20 DIAGNOSIS — Z01.818 PRE-OP TESTING: Primary | ICD-10-CM

## 2023-04-20 DIAGNOSIS — Z01.818 PRE-OP TESTING: ICD-10-CM

## 2023-04-20 LAB
ANION GAP SERPL CALCULATED.3IONS-SCNC: 14 MMOL/L (ref 9–17)
BUN SERPL-MCNC: 46 MG/DL (ref 8–23)
CALCIUM SERPL-MCNC: 9.7 MG/DL (ref 8.6–10.4)
CHLORIDE SERPL-SCNC: 107 MMOL/L (ref 98–107)
CO2 SERPL-SCNC: 24 MMOL/L (ref 20–31)
CREAT SERPL-MCNC: 1.73 MG/DL (ref 0.7–1.2)
GFR SERPL CREATININE-BSD FRML MDRD: 38 ML/MIN/1.73M2
GLUCOSE SERPL-MCNC: 95 MG/DL (ref 70–99)
HCT VFR BLD AUTO: 31.3 % (ref 40.7–50.3)
HGB BLD-MCNC: 9.5 G/DL (ref 13–17)
MCH RBC QN AUTO: 33.3 PG (ref 25.2–33.5)
MCHC RBC AUTO-ENTMCNC: 30.4 G/DL (ref 28.4–34.8)
MCV RBC AUTO: 109.8 FL (ref 82.6–102.9)
NRBC AUTOMATED: 0 PER 100 WBC
PDW BLD-RTO: 14.1 % (ref 11.8–14.4)
PLATELET # BLD AUTO: 141 K/UL (ref 138–453)
PMV BLD AUTO: 11.4 FL (ref 8.1–13.5)
POTASSIUM SERPL-SCNC: 4.7 MMOL/L (ref 3.7–5.3)
RBC # BLD: 2.85 M/UL (ref 4.21–5.77)
SODIUM SERPL-SCNC: 145 MMOL/L (ref 135–144)
WBC # BLD AUTO: 4.6 K/UL (ref 3.5–11.3)

## 2023-04-20 PROCEDURE — 93005 ELECTROCARDIOGRAM TRACING: CPT | Performed by: ANESTHESIOLOGY

## 2023-04-21 LAB
EKG ATRIAL RATE: 65 BPM
EKG Q-T INTERVAL: 480 MS
EKG QRS DURATION: 186 MS
EKG QTC CALCULATION (BAZETT): 495 MS
EKG R AXIS: -97 DEGREES
EKG T AXIS: 76 DEGREES
EKG VENTRICULAR RATE: 64 BPM

## 2023-05-02 ENCOUNTER — ANESTHESIA EVENT (OUTPATIENT)
Dept: OPERATING ROOM | Age: 88
End: 2023-05-02
Payer: MEDICARE

## 2023-05-03 ENCOUNTER — ANESTHESIA (OUTPATIENT)
Dept: OPERATING ROOM | Age: 88
End: 2023-05-03
Payer: MEDICARE

## 2023-05-03 ENCOUNTER — HOSPITAL ENCOUNTER (OUTPATIENT)
Age: 88
Setting detail: OUTPATIENT SURGERY
Discharge: HOME | End: 2023-05-03
Attending: PLASTIC SURGERY | Admitting: PLASTIC SURGERY
Payer: MEDICARE

## 2023-05-03 VITALS
DIASTOLIC BLOOD PRESSURE: 75 MMHG | TEMPERATURE: 98.6 F | RESPIRATION RATE: 19 BRPM | OXYGEN SATURATION: 99 % | BODY MASS INDEX: 22.33 KG/M2 | HEART RATE: 64 BPM | WEIGHT: 156 LBS | HEIGHT: 70 IN | SYSTOLIC BLOOD PRESSURE: 129 MMHG

## 2023-05-03 DIAGNOSIS — D48.5 NEOPLASM OF UNCERTAIN BEHAVIOR OF SKIN: ICD-10-CM

## 2023-05-03 DIAGNOSIS — C44.629 SQUAMOUS CELL CARCINOMA OF DORSUM OF LEFT HAND: ICD-10-CM

## 2023-05-03 LAB
INR PPP: 1.2
PROTHROMBIN TIME: 13.2 SEC (ref 9.4–12.6)

## 2023-05-03 PROCEDURE — 7100000011 HC PHASE II RECOVERY - ADDTL 15 MIN: Performed by: PLASTIC SURGERY

## 2023-05-03 PROCEDURE — 3600000012 HC SURGERY LEVEL 2 ADDTL 15MIN: Performed by: PLASTIC SURGERY

## 2023-05-03 PROCEDURE — 6360000002 HC RX W HCPCS: Performed by: NURSE ANESTHETIST, CERTIFIED REGISTERED

## 2023-05-03 PROCEDURE — 3600000002 HC SURGERY LEVEL 2 BASE: Performed by: PLASTIC SURGERY

## 2023-05-03 PROCEDURE — 36415 COLL VENOUS BLD VENIPUNCTURE: CPT

## 2023-05-03 PROCEDURE — 2500000003 HC RX 250 WO HCPCS: Performed by: NURSE ANESTHETIST, CERTIFIED REGISTERED

## 2023-05-03 PROCEDURE — 2709999900 HC NON-CHARGEABLE SUPPLY: Performed by: PLASTIC SURGERY

## 2023-05-03 PROCEDURE — 88305 TISSUE EXAM BY PATHOLOGIST: CPT

## 2023-05-03 PROCEDURE — 7100000010 HC PHASE II RECOVERY - FIRST 15 MIN: Performed by: PLASTIC SURGERY

## 2023-05-03 PROCEDURE — 2580000003 HC RX 258: Performed by: PLASTIC SURGERY

## 2023-05-03 PROCEDURE — 3700000001 HC ADD 15 MINUTES (ANESTHESIA): Performed by: PLASTIC SURGERY

## 2023-05-03 PROCEDURE — 2500000003 HC RX 250 WO HCPCS: Performed by: PLASTIC SURGERY

## 2023-05-03 PROCEDURE — 85610 PROTHROMBIN TIME: CPT

## 2023-05-03 PROCEDURE — 3700000000 HC ANESTHESIA ATTENDED CARE: Performed by: PLASTIC SURGERY

## 2023-05-03 PROCEDURE — 6360000002 HC RX W HCPCS: Performed by: PLASTIC SURGERY

## 2023-05-03 PROCEDURE — 2580000003 HC RX 258: Performed by: ANESTHESIOLOGY

## 2023-05-03 RX ORDER — FENTANYL CITRATE 50 UG/ML
INJECTION, SOLUTION INTRAMUSCULAR; INTRAVENOUS PRN
Status: DISCONTINUED | OUTPATIENT
Start: 2023-05-03 | End: 2023-05-03 | Stop reason: SDUPTHER

## 2023-05-03 RX ORDER — DIPHENHYDRAMINE HYDROCHLORIDE 50 MG/ML
12.5 INJECTION INTRAMUSCULAR; INTRAVENOUS
Status: DISCONTINUED | OUTPATIENT
Start: 2023-05-03 | End: 2023-05-03 | Stop reason: HOSPADM

## 2023-05-03 RX ORDER — PROPOFOL 10 MG/ML
INJECTION, EMULSION INTRAVENOUS PRN
Status: DISCONTINUED | OUTPATIENT
Start: 2023-05-03 | End: 2023-05-03 | Stop reason: SDUPTHER

## 2023-05-03 RX ORDER — SODIUM CHLORIDE 0.9 % (FLUSH) 0.9 %
5-40 SYRINGE (ML) INJECTION EVERY 12 HOURS SCHEDULED
Status: DISCONTINUED | OUTPATIENT
Start: 2023-05-03 | End: 2023-05-03 | Stop reason: HOSPADM

## 2023-05-03 RX ORDER — METOCLOPRAMIDE HYDROCHLORIDE 5 MG/ML
10 INJECTION INTRAMUSCULAR; INTRAVENOUS
Status: DISCONTINUED | OUTPATIENT
Start: 2023-05-03 | End: 2023-05-03 | Stop reason: HOSPADM

## 2023-05-03 RX ORDER — ONDANSETRON 2 MG/ML
4 INJECTION INTRAMUSCULAR; INTRAVENOUS
Status: DISCONTINUED | OUTPATIENT
Start: 2023-05-03 | End: 2023-05-03 | Stop reason: HOSPADM

## 2023-05-03 RX ORDER — SODIUM CHLORIDE 9 MG/ML
25 INJECTION, SOLUTION INTRAVENOUS PRN
Status: DISCONTINUED | OUTPATIENT
Start: 2023-05-03 | End: 2023-05-03 | Stop reason: HOSPADM

## 2023-05-03 RX ORDER — CEPHALEXIN 500 MG/1
500 CAPSULE ORAL 3 TIMES DAILY
Qty: 15 CAPSULE | Refills: 0 | Status: SHIPPED | OUTPATIENT
Start: 2023-05-03 | End: 2023-05-08

## 2023-05-03 RX ORDER — OXYCODONE HYDROCHLORIDE 5 MG/1
10 TABLET ORAL PRN
Status: DISCONTINUED | OUTPATIENT
Start: 2023-05-03 | End: 2023-05-03 | Stop reason: HOSPADM

## 2023-05-03 RX ORDER — SODIUM CHLORIDE 9 MG/ML
INJECTION, SOLUTION INTRAVENOUS PRN
Status: DISCONTINUED | OUTPATIENT
Start: 2023-05-03 | End: 2023-05-03 | Stop reason: HOSPADM

## 2023-05-03 RX ORDER — SEVOFLURANE 250 ML/250ML
LIQUID RESPIRATORY (INHALATION)
Status: DISCONTINUED
Start: 2023-05-03 | End: 2023-05-03 | Stop reason: HOSPADM

## 2023-05-03 RX ORDER — ONDANSETRON 2 MG/ML
INJECTION INTRAMUSCULAR; INTRAVENOUS PRN
Status: DISCONTINUED | OUTPATIENT
Start: 2023-05-03 | End: 2023-05-03 | Stop reason: SDUPTHER

## 2023-05-03 RX ORDER — LIDOCAINE HYDROCHLORIDE 10 MG/ML
INJECTION, SOLUTION EPIDURAL; INFILTRATION; INTRACAUDAL; PERINEURAL PRN
Status: DISCONTINUED | OUTPATIENT
Start: 2023-05-03 | End: 2023-05-03 | Stop reason: SDUPTHER

## 2023-05-03 RX ORDER — MEPERIDINE HYDROCHLORIDE 50 MG/ML
12.5 INJECTION INTRAMUSCULAR; INTRAVENOUS; SUBCUTANEOUS ONCE
Status: DISCONTINUED | OUTPATIENT
Start: 2023-05-03 | End: 2023-05-03 | Stop reason: HOSPADM

## 2023-05-03 RX ORDER — CEFAZOLIN 2 G/1
INJECTION, POWDER, FOR SOLUTION INTRAMUSCULAR; INTRAVENOUS
Status: DISCONTINUED
Start: 2023-05-03 | End: 2023-05-03 | Stop reason: HOSPADM

## 2023-05-03 RX ORDER — BUPIVACAINE HYDROCHLORIDE AND EPINEPHRINE 5; 5 MG/ML; UG/ML
INJECTION, SOLUTION EPIDURAL; INTRACAUDAL; PERINEURAL PRN
Status: DISCONTINUED | OUTPATIENT
Start: 2023-05-03 | End: 2023-05-03 | Stop reason: ALTCHOICE

## 2023-05-03 RX ORDER — SODIUM CHLORIDE 0.9 % (FLUSH) 0.9 %
5-40 SYRINGE (ML) INJECTION PRN
Status: DISCONTINUED | OUTPATIENT
Start: 2023-05-03 | End: 2023-05-03 | Stop reason: HOSPADM

## 2023-05-03 RX ORDER — HYDRALAZINE HYDROCHLORIDE 20 MG/ML
10 INJECTION INTRAMUSCULAR; INTRAVENOUS
Status: DISCONTINUED | OUTPATIENT
Start: 2023-05-03 | End: 2023-05-03 | Stop reason: HOSPADM

## 2023-05-03 RX ORDER — MORPHINE SULFATE 2 MG/ML
1 INJECTION, SOLUTION INTRAMUSCULAR; INTRAVENOUS EVERY 5 MIN PRN
Status: DISCONTINUED | OUTPATIENT
Start: 2023-05-03 | End: 2023-05-03 | Stop reason: HOSPADM

## 2023-05-03 RX ORDER — OXYCODONE HYDROCHLORIDE 5 MG/1
5 TABLET ORAL PRN
Status: DISCONTINUED | OUTPATIENT
Start: 2023-05-03 | End: 2023-05-03 | Stop reason: HOSPADM

## 2023-05-03 RX ORDER — LIDOCAINE HYDROCHLORIDE 10 MG/ML
1 INJECTION, SOLUTION INFILTRATION; PERINEURAL
Status: DISCONTINUED | OUTPATIENT
Start: 2023-05-03 | End: 2023-05-03 | Stop reason: HOSPADM

## 2023-05-03 RX ORDER — SODIUM CHLORIDE, SODIUM LACTATE, POTASSIUM CHLORIDE, CALCIUM CHLORIDE 600; 310; 30; 20 MG/100ML; MG/100ML; MG/100ML; MG/100ML
INJECTION, SOLUTION INTRAVENOUS CONTINUOUS
Status: DISCONTINUED | OUTPATIENT
Start: 2023-05-03 | End: 2023-05-03 | Stop reason: HOSPADM

## 2023-05-03 RX ADMIN — PROPOFOL 100 MG: 10 INJECTION, EMULSION INTRAVENOUS at 09:19

## 2023-05-03 RX ADMIN — LIDOCAINE HYDROCHLORIDE 40 MG: 10 INJECTION, SOLUTION EPIDURAL; INFILTRATION; INTRACAUDAL; PERINEURAL at 09:19

## 2023-05-03 RX ADMIN — FENTANYL CITRATE 50 MCG: 50 INJECTION, SOLUTION INTRAMUSCULAR; INTRAVENOUS at 09:19

## 2023-05-03 RX ADMIN — ONDANSETRON 4 MG: 2 INJECTION INTRAMUSCULAR; INTRAVENOUS at 09:26

## 2023-05-03 RX ADMIN — CEFAZOLIN 2000 MG: 2 INJECTION, POWDER, FOR SOLUTION INTRAMUSCULAR; INTRAVENOUS at 09:22

## 2023-05-03 RX ADMIN — SODIUM CHLORIDE: 9 INJECTION, SOLUTION INTRAVENOUS at 08:40

## 2023-05-03 ASSESSMENT — ENCOUNTER SYMPTOMS: SHORTNESS OF BREATH: 1

## 2023-05-03 ASSESSMENT — COPD QUESTIONNAIRES: CAT_SEVERITY: NO INTERVAL CHANGE

## 2023-05-03 ASSESSMENT — PAIN - FUNCTIONAL ASSESSMENT: PAIN_FUNCTIONAL_ASSESSMENT: 0-10

## 2023-05-03 NOTE — ANESTHESIA POSTPROCEDURE EVALUATION
POST- ANESTHESIA EVALUATION       Pt Name: Lynnette Cannon  MRN: [id number]  [place]: 12/22/1934  Date of evaluation: 5/3/2023  Time:  10:54 AM      /75   Pulse 64   Temp 98.6 °F (37 °C)   Resp 19   Ht 5' 10\" (1.778 m)   Wt 156 lb (70.8 kg)   SpO2 99%   BMI 22.38 kg/m²      Consciousness Level  Awake  Cardiopulmonary Status  Stable  Pain Adequately Treated YES  Nausea / Vomiting  NO  Adequate Hydration  YES  Anesthesia Related Complications NONE      Electronically signed by Jeovany Mari MD on 5/3/2023 at 10:54 AM       Department of Anesthesiology  Postprocedure Note    Patient: Lynnette Cannon  MRN: [id number]  [place]: 12/22/1934  Date of evaluation: 5/3/2023      Procedure Summary     Date: 05/03/23 Room / Location: [address]    Anesthesia Start: 7427 Anesthesia Stop: 8674    Procedure: LEFT HAND DORSUM SQUAMOUS CELL CARCINOMA LESION, RIGHT HAND DORSUM LESION EXCISION, AND RIGHT ARM LESION (Bilateral: Hand) Diagnosis:       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     Torres Phase I: Torres Score: 10    Torres Phase II:        Anesthesia Post Evaluation

## 2023-05-03 NOTE — ANESTHESIA PRE PROCEDURE
mouth Daily 7/5/17   Historical Provider, MD   simvastatin (ZOCOR) 10 MG tablet Take 1 tablet by mouth nightly 7/5/17   Historical Provider, MD   terazosin (HYTRIN) 2 MG capsule Take 1 capsule by mouth nightly 7/5/17   Historical Provider, MD   warfarin (COUMADIN) 2 MG tablet  7/5/17   Historical Provider, MD   warfarin (COUMADIN) 5 MG tablet  7/5/17   Historical Provider, MD       Current medications:    Current Facility-Administered Medications   Medication Dose Route Frequency Provider Last Rate Last Admin    lidocaine 1 % injection 1 mL  1 mL IntraDERmal Once PRN Rhona Celaya MD        lactated ringers IV soln infusion   IntraVENous Continuous Rhona Celaya MD        sodium chloride flush 0.9 % injection 5-40 mL  5-40 mL IntraVENous 2 times per day Rhona Celaya MD        sodium chloride flush 0.9 % injection 5-40 mL  5-40 mL IntraVENous PRN Rhona Celaya MD        0.9 % sodium chloride infusion   IntraVENous PRN Rhona Celaya MD        ceFAZolin (ANCEF) 2,000 mg in sodium chloride 0.9 % 50 mL IVPB (mini-bag)  2,000 mg IntraVENous Once Clarence Ng MD           Allergies:     Allergies   Allergen Reactions    Atorvastatin Other (See Comments)     Other reaction(s): muscle cramps, Other: See Comments    Penicillins Rash    Entex T [Pseudoephedrine-Guaifenesin]        Problem List:    Patient Active Problem List   Diagnosis Code    Basal cell carcinoma of right lateral cheek C44.319    Anemia D64.9    Candidal balanitis B37.42    Carotid stenosis I65.29    Chronic obstructive pulmonary disease (HCC) L63.3    Diastolic heart failure (HCC) I50.30    Enlarged prostate N40.0    Gastro-esophageal reflux disease without esophagitis K21.9    History of mitral valve replacement Z95.2    Hypertension I10    Hypothyroidism E03.9    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

## 2023-05-03 NOTE — H&P
Never   Vaping Use    Vaping Use: Never used   Substance and Sexual Activity    Alcohol use: Not Currently    Drug use: No    Sexual activity: Not on file   Other Topics Concern    Not on file   Social History Narrative    Not on file      Social Determinants of Health      Financial Resource Strain: Not on file   Food Insecurity: Not on file   Transportation Needs: Not on file   Physical Activity: Not on file   Stress: Not on file   Social Connections: Not on file   Intimate Partner Violence: Not on file   Housing Stability: Not on file               ROS:  All systems reviewed. Denies chest pain, shortness of breath, abdominal pain, n/v/d/c. Denies rashes, any upper respiratory illness or fever / chills. Denies dizziness, headaches, tremor. Negative other than those noted above. Allergies   Allergen Reactions    Atorvastatin Other (See Comments)       Other reaction(s): muscle cramps, Other: See Comments    Penicillins Rash    Entex T [Pseudoephedrine-Guaifenesin]           Physical Exam:  VITALS:  height is 5' 11\" (1.803 m) and weight is 150 lb (68 kg). His respiration is 16. CONSTITUTIONAL:alert & orientated x 3, no acute distress   SKIN:  Warm and dry, no rashes. See assessment below  HEAD:  Normocephalic, atraumatic  EYES: PERRL. EOMs intact. EARS:  Hearing grossly WNL. NOSE:  Nares patent. No rhinorrhea  THROAT:  benign  NECK:supple, no lymphadenopathy  LUNGS: No audible adventitious lung sounds. Patient is breathing normally without issues speaking in full sentences. CARDIOVASCULAR: Heart sounds are normal. Pulses equal bilaterally. Skin color appropriate throughout. Regular rate and rhythm without murmur, gallop or rub. ABDOMEN: soft, non tender, non distended, no masses or organomegaly   EXTREMITIES: no edema bilateral lower extremities         Assessment:  1. Diagnosis Orders   1. Squamous cell carcinoma of left hand                Plan:   At this time we discussed his

## 2023-05-03 NOTE — OP NOTE
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 [C44.629]     * Neoplasm of uncertain behavior of skin [D48.5]    Post-Op Diagnosis: Same       Procedure(s):  LEFT HAND DORSUM SQUAMOUS CELL CARCINOMA LESION, RIGHT HAND DORSUM LESION EXCISION, AND RIGHT ARM LESION    Surgeon(s):  Betsy Shea MD    Assistant:   * No surgical staff found *    Anesthesia: Monitor Anesthesia Care    Estimated Blood Loss (mL): Minimal    Complications: None    Specimens:   ID Type Source Tests Collected by Time Destination   A : RIGHT FOREARM LESION Tissue Hand SURGICAL PATHOLOGY Betsy Shea MD 5/3/2023 3206    B : RIGHT HAND LESION Tissue Hand SURGICAL PATHOLOGY Betsy Shea MD 5/3/2023 4054    C : LEFT HAND LESION Tissue Hand SURGICAL PATHOLOGY Betsy Shea MD 5/3/2023 0930        Implants:  * No implants in log *      Drains: * No LDAs found *    Findings: This procedure was not performed to treat primary cutaneous melanoma through wide local excision      Detailed Description of Procedure: With patient by the general anesthesia induced via postpharyngeal LMA intubation the patient's bilateral extremities were prepped draped in a sterile fashion the lesion on the dorsum of the right hand, left hand and right proximal dorsal forearm were injected with half percent Marcaine 1-200,000 epinephrine local.  The 11 mm lesion on each area was elliptically excised with Bovie electrocautery hemostasis. Interrupted and running over and over 4-0 Prolene were used to close each area and Steri-Strips were applied. He will follow in the office in 2 weeks time.     Electronically signed by Betsy Shea MD on 5/3/2023 at 10:07 AM

## 2023-05-04 LAB — DERMATOLOGY PATHOLOGY REPORT: NORMAL

## 2023-05-24 ENCOUNTER — APPOINTMENT (OUTPATIENT)
Dept: GENERAL RADIOLOGY | Age: 88
End: 2023-05-24
Payer: MEDICARE

## 2023-05-24 ENCOUNTER — APPOINTMENT (OUTPATIENT)
Dept: CT IMAGING | Age: 88
End: 2023-05-24
Payer: MEDICARE

## 2023-05-24 ENCOUNTER — HOSPITAL ENCOUNTER (EMERGENCY)
Age: 88
Discharge: HOME | End: 2023-05-24
Attending: EMERGENCY MEDICINE
Payer: MEDICARE

## 2023-05-24 VITALS
SYSTOLIC BLOOD PRESSURE: 127 MMHG | HEIGHT: 71 IN | RESPIRATION RATE: 16 BRPM | TEMPERATURE: 98.1 F | BODY MASS INDEX: 22.4 KG/M2 | OXYGEN SATURATION: 93 % | WEIGHT: 160 LBS | HEART RATE: 66 BPM | DIASTOLIC BLOOD PRESSURE: 67 MMHG

## 2023-05-24 DIAGNOSIS — S32.10XA CLOSED FRACTURE OF SACRUM AND COCCYX, INITIAL ENCOUNTER (HCC): Primary | ICD-10-CM

## 2023-05-24 DIAGNOSIS — S32.2XXA CLOSED FRACTURE OF COCCYX, INITIAL ENCOUNTER (HCC): ICD-10-CM

## 2023-05-24 DIAGNOSIS — S32.2XXA CLOSED FRACTURE OF SACRUM AND COCCYX, INITIAL ENCOUNTER (HCC): Primary | ICD-10-CM

## 2023-05-24 LAB
ANION GAP SERPL CALCULATED.3IONS-SCNC: 11 MMOL/L (ref 9–17)
BASOPHILS # BLD: 0 K/UL (ref 0–0.2)
BASOPHILS NFR BLD: 1 % (ref 0–2)
BUN SERPL-MCNC: 62 MG/DL (ref 8–23)
CALCIUM SERPL-MCNC: 9.1 MG/DL (ref 8.6–10.4)
CHLORIDE SERPL-SCNC: 102 MMOL/L (ref 98–107)
CO2 SERPL-SCNC: 28 MMOL/L (ref 20–31)
CREAT SERPL-MCNC: 1.68 MG/DL (ref 0.7–1.2)
EOSINOPHIL # BLD: 0 K/UL (ref 0–0.4)
EOSINOPHILS RELATIVE PERCENT: 1 % (ref 1–4)
ERYTHROCYTE [DISTWIDTH] IN BLOOD BY AUTOMATED COUNT: 15 % (ref 12.5–15.4)
GFR SERPL CREATININE-BSD FRML MDRD: 39 ML/MIN/1.73M2
GLUCOSE SERPL-MCNC: 119 MG/DL (ref 70–99)
HCT VFR BLD AUTO: 29.7 % (ref 41–53)
HGB BLD-MCNC: 9.4 G/DL (ref 13.5–17.5)
LYMPHOCYTES # BLD: 10 % (ref 24–44)
LYMPHOCYTES NFR BLD: 0.5 K/UL (ref 1–4.8)
MCH RBC QN AUTO: 33.1 PG (ref 26–34)
MCHC RBC AUTO-ENTMCNC: 31.8 G/DL (ref 31–37)
MCV RBC AUTO: 104.1 FL (ref 80–100)
MONOCYTES NFR BLD: 0.8 K/UL (ref 0.1–1.2)
MONOCYTES NFR BLD: 14 % (ref 2–11)
NEUTROPHILS NFR BLD: 74 % (ref 36–66)
NEUTS SEG NFR BLD: 4.2 K/UL (ref 1.8–7.7)
PLATELET # BLD AUTO: 166 K/UL (ref 140–450)
PMV BLD AUTO: 7.8 FL (ref 6–12)
POTASSIUM SERPL-SCNC: 4.5 MMOL/L (ref 3.7–5.3)
RBC # BLD AUTO: 2.85 M/UL (ref 4.5–5.9)
SODIUM SERPL-SCNC: 141 MMOL/L (ref 135–144)
WBC OTHER # BLD: 5.6 K/UL (ref 3.5–11)

## 2023-05-24 PROCEDURE — 70450 CT HEAD/BRAIN W/O DYE: CPT

## 2023-05-24 PROCEDURE — 72220 X-RAY EXAM SACRUM TAILBONE: CPT

## 2023-05-24 PROCEDURE — 85025 COMPLETE CBC W/AUTO DIFF WBC: CPT

## 2023-05-24 PROCEDURE — 36415 COLL VENOUS BLD VENIPUNCTURE: CPT

## 2023-05-24 PROCEDURE — 99284 EMERGENCY DEPT VISIT MOD MDM: CPT

## 2023-05-24 PROCEDURE — 80048 BASIC METABOLIC PNL TOTAL CA: CPT

## 2023-05-24 ASSESSMENT — PAIN - FUNCTIONAL ASSESSMENT: PAIN_FUNCTIONAL_ASSESSMENT: 0-10

## 2023-05-24 ASSESSMENT — PAIN SCALES - GENERAL: PAINLEVEL_OUTOF10: 8

## 2023-05-24 NOTE — ED PROVIDER NOTES
[address]      Pt Name: Richard Hernández  MRN: [id number]  Julianngfnorris 12/22/1934  Date of evaluation: 5/24/2023      CHIEF COMPLAINT       Chief Complaint   Patient presents with    Fall     Tailbone pain, states fell one week ago and his doctors office sent him to get xray         HISTORY OF PRESENT ILLNESS      The patient presents with tailbone pain. A week ago he fell when he was taking out his garbage cans. He fell onto his tailbone. He did hit his head but he does not have a headache and had no loss of consciousness. He was sent in for an x-ray by his doctor's office. The patient has had some recent biopsies of his skin on his wrist but has no other real complaints. He denies focal weakness or numbness. REVIEW OF SYSTEMS       All systems reviewed and negative unless noted in HPI. The patient denies fever or constitutional symptoms. Denies vision change. Denies any neck pain or stiffness. Denies chest pain or shortness of breath. No nausea,  vomiting or diarrhea. Injury to tailbone. Patient did strike his head. Denies any weakness, numbness or focal neurologic deficit. Skin biopsies. No recent psychiatric issues. No easy bruising or bleeding. History of DM. PAST MEDICAL HISTORY    has a past medical history of Anemia, Asthma, Atrial fibrillation ([address] CAD (coronary artery disease), Cancer ([address] CHF (congestive heart failure) ([address] CKD (chronic kidney disease), COPD (chronic obstructive pulmonary disease) ([address] Diabetes ([address] Heart valve disease, [place] (hard of hearing), Hypertension, Hypothyroidism, ERIC (obstructive sleep apnea), and Oxygen deficit. SURGICAL HISTORY      has a past surgical history that includes malignant skin lesion excision (Right, 08/18/2017);  Tonsillectomy; hernia repair; Revision total knee arthroplasty; pacemaker placement; Mitral valve replacement; Cardiac valuve replacement (02/2018);

## 2023-05-24 NOTE — DISCHARGE INSTRUCTIONS
Activity as tolerated. May take Tylenol and Motrin as directed. Return for worsening pain, weakness, numbness, bowel or bladder problems, or if worse in any way. Please understand that at this time there is no evidence for a more serious underlying process, but that early in the process of an illness or injury, an emergency department workup can be falsely reassuring. You should contact your family doctor within the next 48 hours for a follow up appointment    Blackjoseluz Olivarez!!!    From [facility] and [facility] Emergency Services    On behalf of the Emergency Department staff at [facility]), I would like to thank you for giving us the opportunity to address your health care needs and concerns. We hope that during your visit, our service was delivered in a professional and caring manner. Please keep [facility] in mind as we walk with you down the path to your own personal wellness. Please expect an automated text message or email from us so we can ask a few questions about your health and progress. Based on your answers, a clinician may call you back to offer help and instructions. Please understand that early in the process of an illness or injury, an emergency department workup can be falsely reassuring. If you notice any worsening, changing or persistent symptoms please call your family doctor or return to the ER immediately. Tell us how we did during your visit at [email or web address]   and let us know about your experience

## 2023-07-07 ENCOUNTER — HOSPITAL ENCOUNTER (OUTPATIENT)
Age: 88
Setting detail: SPECIMEN
Discharge: HOME | End: 2023-07-07

## 2023-07-07 LAB
ALBUMIN SERPL-MCNC: 3.8 G/DL (ref 3.5–5.2)
ALBUMIN/GLOB SERPL: 1.2 {RATIO} (ref 1–2.5)
ALP SERPL-CCNC: 81 U/L (ref 40–129)
ALT SERPL-CCNC: 21 U/L (ref 5–41)
ANION GAP SERPL CALCULATED.3IONS-SCNC: 11 MMOL/L (ref 9–17)
AST SERPL-CCNC: 16 U/L
BASOPHILS # BLD: 0.03 K/UL (ref 0–0.2)
BASOPHILS NFR BLD: 1 % (ref 0–2)
BILIRUB SERPL-MCNC: 0.4 MG/DL (ref 0.3–1.2)
BUN SERPL-MCNC: 43 MG/DL (ref 8–23)
CALCIUM SERPL-MCNC: 9 MG/DL (ref 8.6–10.4)
CHLORIDE SERPL-SCNC: 107 MMOL/L (ref 98–107)
CHOLEST SERPL-MCNC: 140 MG/DL
CHOLESTEROL/HDL RATIO: 2.2
CO2 SERPL-SCNC: 26 MMOL/L (ref 20–31)
CREAT SERPL-MCNC: 1.45 MG/DL (ref 0.7–1.2)
EOSINOPHIL # BLD: 0.14 K/UL (ref 0–0.44)
EOSINOPHILS RELATIVE PERCENT: 3 % (ref 1–4)
ERYTHROCYTE [DISTWIDTH] IN BLOOD BY AUTOMATED COUNT: 14.7 % (ref 11.8–14.4)
EST. AVERAGE GLUCOSE BLD GHB EST-MCNC: 126 MG/DL
FOLATE SERPL-MCNC: >20 NG/ML
GFR SERPL CREATININE-BSD FRML MDRD: 46 ML/MIN/1.73M2
GLUCOSE SERPL-MCNC: 95 MG/DL (ref 70–99)
HBA1C MFR BLD: 6 % (ref 4–6)
HCT VFR BLD AUTO: 30.6 % (ref 40.7–50.3)
HDLC SERPL-MCNC: 64 MG/DL
HGB BLD-MCNC: 9.2 G/DL (ref 13–17)
IMM GRANULOCYTES # BLD AUTO: <0.03 K/UL (ref 0–0.3)
IMM GRANULOCYTES NFR BLD: 0 %
LDLC SERPL CALC-MCNC: 66 MG/DL (ref 0–130)
LYMPHOCYTES # BLD: 23 % (ref 24–43)
LYMPHOCYTES NFR BLD: 1.11 K/UL (ref 1.1–3.7)
MCH RBC QN AUTO: 33 PG (ref 25.2–33.5)
MCHC RBC AUTO-ENTMCNC: 30.1 G/DL (ref 28.4–34.8)
MCV RBC AUTO: 109.7 FL (ref 82.6–102.9)
MONOCYTES NFR BLD: 0.53 K/UL (ref 0.1–1.2)
MONOCYTES NFR BLD: 11 % (ref 3–12)
NEUTROPHILS NFR BLD: 62 % (ref 36–65)
NEUTS SEG NFR BLD: 3.06 K/UL (ref 1.5–8.1)
NRBC BLD-RTO: 0 PER 100 WBC
PLATELET # BLD AUTO: 141 K/UL (ref 138–453)
PMV BLD AUTO: 10.9 FL (ref 8.1–13.5)
POTASSIUM SERPL-SCNC: 4.1 MMOL/L (ref 3.7–5.3)
PROT SERPL-MCNC: 7 G/DL (ref 6.4–8.3)
RBC # BLD AUTO: 2.79 M/UL (ref 4.21–5.77)
RBC # BLD: ABNORMAL 10*6/UL
RBC # BLD: ABNORMAL 10*6/UL
SODIUM SERPL-SCNC: 144 MMOL/L (ref 135–144)
T4 FREE SERPL-MCNC: 1.5 NG/DL (ref 0.9–1.7)
TRIGL SERPL-MCNC: 51 MG/DL
TSH SERPL DL<=0.05 MIU/L-ACNC: 1.89 UIU/ML (ref 0.3–5)
VIT B12 SERPL-MCNC: 663 PG/ML (ref 232–1245)
WBC OTHER # BLD: 4.9 K/UL (ref 3.5–11.3)

## 2023-08-30 ENCOUNTER — TELEPHONE (OUTPATIENT)
Age: 88
End: 2023-08-30

## 2023-08-30 NOTE — TELEPHONE ENCOUNTER
PT has had a hard time making an apt with Dr. Norma Najera  Can we call their office and have them call him to make an apt?

## 2023-08-31 NOTE — TELEPHONE ENCOUNTER
Dr Walden's office contacted, spoke w/. She said she would reach out to patient to sched an appt. I called Markie Arambula and let him know her office would be contacting him to sched the appt.

## 2023-09-07 ENCOUNTER — TELEPHONE (OUTPATIENT)
Age: 88
End: 2023-09-07

## 2023-09-07 DIAGNOSIS — G31.84 MCI (MILD COGNITIVE IMPAIRMENT): Primary | ICD-10-CM

## 2023-09-07 DIAGNOSIS — J44.9 ASTHMA-COPD OVERLAP SYNDROME (HCC): ICD-10-CM

## 2023-09-07 NOTE — TELEPHONE ENCOUNTER
Received call from  that patient has had inappropriate behavior with nursing staff in their office. She can't continue to see him as he is inappropriate with the staff. I have received messages from [facility] rehab that they have concerns with him living on his own due to safety. Will update his son.

## 2023-09-07 NOTE — TELEPHONE ENCOUNTER
Negrita Sawyer, his son [phone]    Updated on recent event and I feel his dad needs to be in an AL. He will discuss with his dad but his father is very stubborn. BERTA Louis- he will think about AL.     He is agreeable to home health- ordered    Informed him he needs to see a new pulmonologist as Dr. Aisha Castellano can no longer see him    Please help him set up home kashif and referral to Dr. Elsie Pinto placed

## 2023-09-11 RX ORDER — REVEFENACIN 175 UG/3ML
175 SOLUTION RESPIRATORY (INHALATION) DAILY
Qty: 90 ML | Refills: 5 | Status: SHIPPED | OUTPATIENT
Start: 2023-09-11 | End: 2023-09-12

## 2023-09-11 NOTE — TELEPHONE ENCOUNTER
Pt called concerned who will be filling his medication that Dr Terence Echevarria was prescribing him? Will Dr Ferguson Mean be filing for him?   [facility] [address]

## 2023-09-12 ENCOUNTER — TELEPHONE (OUTPATIENT)
Age: 88
End: 2023-09-12

## 2023-09-12 DIAGNOSIS — R06.89 ACUTE RESPIRATORY INSUFFICIENCY: Primary | ICD-10-CM

## 2023-09-12 RX ORDER — REVEFENACIN 175 UG/3ML
3 SOLUTION RESPIRATORY (INHALATION) DAILY
Qty: 90 ML | Refills: 5 | Status: SHIPPED | OUTPATIENT
Start: 2023-09-12 | End: 2023-09-13 | Stop reason: SDUPTHER

## 2023-09-12 NOTE — TELEPHONE ENCOUNTER
Anthony Castillo from [facility] called regarding the [employer], this RX needs a DX code added to RX and Pharmacist recommended DX Code R06.89. New RX needed.

## 2023-09-13 DIAGNOSIS — R06.89 ACUTE RESPIRATORY INSUFFICIENCY: ICD-10-CM

## 2023-09-13 DIAGNOSIS — J44.9 OBSTRUCTIVE CHRONIC BRONCHITIS WITHOUT EXACERBATION (HCC): Primary | ICD-10-CM

## 2023-09-13 PROBLEM — R06.02 SHORT OF BREATH ON EXERTION: Status: ACTIVE | Noted: 2022-11-29

## 2023-09-13 RX ORDER — REVEFENACIN 175 UG/3ML
3 SOLUTION RESPIRATORY (INHALATION) DAILY
Qty: 90 ML | Refills: 5 | Status: SHIPPED | OUTPATIENT
Start: 2023-09-13 | End: 2024-03-11

## 2023-09-13 NOTE — TELEPHONE ENCOUNTER
Please resend the script. They needed a different dx code on it. Per pharmacy they needed J44.9 for insurance to cover. Code was updated.    Roque Maria is calling to request a refill on the following medication(s):    Medication Request:  Requested Prescriptions     Pending Prescriptions Disp Refills    revefenacin (YUPELRI) 175 MCG/3ML SOLN 90 mL 5     Sig: Inhale 3 mLs into the lungs daily       Last Visit Date (If Applicable):  Visit date not found    Next Visit Date:    10/16/2023

## 2023-09-14 RX ORDER — WARFARIN SODIUM 2.5 MG/1
TABLET ORAL
Qty: 12 TABLET | Refills: 3 | Status: SHIPPED | OUTPATIENT
Start: 2023-09-14

## 2023-09-14 NOTE — TELEPHONE ENCOUNTER
Dimitri Martinez is calling to request a refill on the following medication(s):    Medication Request:  Requested Prescriptions     Pending Prescriptions Disp Refills    JANTOVEN 2.5 MG tablet [Pharmacy Med Name: Orbluz Salcedo 2.5 MG TABLET] 12 tablet      Sig: TAKE ONE TABLET BY MOUTH ONCE WEEKLY ON TUESDAY AS DIRECTED       Last Visit Date (If Applicable):  Visit date not found    Next Visit Date:    10/16/2023

## 2023-09-18 DIAGNOSIS — J44.9 OBSTRUCTIVE CHRONIC BRONCHITIS WITHOUT EXACERBATION (HCC): ICD-10-CM

## 2023-09-18 RX ORDER — REVEFENACIN 175 UG/3ML
3 SOLUTION RESPIRATORY (INHALATION) DAILY
Qty: 90 ML | Refills: 5 | Status: SHIPPED | OUTPATIENT
Start: 2023-09-18 | End: 2024-03-16

## 2023-09-18 NOTE — TELEPHONE ENCOUNTER
Yumiko Noonan is calling to request a refill on the following medication(s):    Medication Request:  Requested Prescriptions     Pending Prescriptions Disp Refills    revefenacin (YUPELRI) 175 MCG/3ML SOLN 90 mL 5     Sig: Inhale 3 mLs into the lungs daily       Last Visit Date (If Applicable):  Visit date not found    Next Visit Date:    10/16/2023

## 2023-09-18 NOTE — TELEPHONE ENCOUNTER
Please send RX for Revefenacin 175 mcg to [facility] , it looks like it was sent to [address] [facility]. He has been out for a couple of weeks. Please advise patient when done.

## 2023-09-20 RX ORDER — METOPROLOL TARTRATE 50 MG/1
TABLET, FILM COATED ORAL
Qty: 180 TABLET | Refills: 2 | Status: SHIPPED | OUTPATIENT
Start: 2023-09-20

## 2023-09-20 RX ORDER — SIMVASTATIN 10 MG
10 TABLET ORAL
Qty: 90 TABLET | Refills: 2 | Status: SHIPPED | OUTPATIENT
Start: 2023-09-20

## 2023-09-20 RX ORDER — OMEPRAZOLE 20 MG/1
20 CAPSULE, DELAYED RELEASE ORAL DAILY
Qty: 90 CAPSULE | Refills: 2 | Status: SHIPPED | OUTPATIENT
Start: 2023-09-20

## 2023-09-20 NOTE — TELEPHONE ENCOUNTER
Clarke Lazar is calling to request a refill on the following medication(s):    Medication Request:  Requested Prescriptions     Pending Prescriptions Disp Refills    simvastatin (ZOCOR) 10 MG tablet [Pharmacy Med Name: SIMVASTATIN 10 MG TABLET] 90 tablet      Sig: TAKE ONE TABLET BY MOUTH EVERY EVENING    metoprolol tartrate (LOPRESSOR) 50 MG tablet [Pharmacy Med Name: METOPROLOL TARTRATE 50 MG TAB] 180 tablet      Sig: TAKE ONE TABLET BY MOUTH TWICE A DAY WITH FOOD    omeprazole (PRILOSEC) 20 MG delayed release capsule [Pharmacy Med Name: OMEPRAZOLE DR 20 MG CAPSULE] 90 capsule      Sig: TAKE ONE CAPSULE BY MOUTH DAILY       Last Visit Date (If Applicable):  Visit date not found    Next Visit Date:    10/16/2023

## 2023-10-14 VITALS
HEIGHT: 71 IN | HEART RATE: 60 BPM | BODY MASS INDEX: 21.56 KG/M2 | WEIGHT: 154 LBS | RESPIRATION RATE: 22 BRPM | SYSTOLIC BLOOD PRESSURE: 130 MMHG | DIASTOLIC BLOOD PRESSURE: 72 MMHG

## 2023-10-14 RX ORDER — LEVALBUTEROL INHALATION SOLUTION 1.25 MG/3ML
1.25 SOLUTION RESPIRATORY (INHALATION)
COMMUNITY
Start: 2022-09-18

## 2023-10-14 RX ORDER — ESCITALOPRAM OXALATE 10 MG/1
1 TABLET ORAL DAILY
COMMUNITY
Start: 2023-08-15

## 2023-10-18 ENCOUNTER — TELEPHONE (OUTPATIENT)
Age: 88
End: 2023-10-18

## 2023-10-18 NOTE — TELEPHONE ENCOUNTER
Catarino Gagnon from [facility] called to check if Madelyn Fisher came to appointment on Mon 10/16. .    I informed her that he did not show up and it shows as canceled. She stated he understands to keep getting home care he has to show up for his doctor appointments. Catarino Gagnon just wanted me to let you know she called and no call back needed. Thank you.

## 2023-10-20 NOTE — TELEPHONE ENCOUNTER
Spoke to patient. He was not aware he was scheduled for an appointment on Mon 10/16. He will call back to reschedule as soon as he can. He has a lot of appointments coming up in Nov.    Dr. Ernie Christiansen first available is Nov. 30th. He states he is having shots for iron on 11/1 and 11/8. Ordered by Dr. Arnold العراقي. Then he has another appointment on 11/24 or 11/27 but couldn't remember what that was. He wants to catch up with Dr. Ernie Christiansen on things. And is aware that he needs to see her to keep the home health service. [facility] should have sent over some notes about him not doing PT anymore because of his heart and how it's working too hard and his has trouble breathing. Please reach out to him if you would like to see him sooner. Thank you.

## 2023-10-20 NOTE — TELEPHONE ENCOUNTER
Christine Niño, reviewed   Yes, hopefully FE will help with his symptoms and he can fu with me afterwards

## 2023-10-30 ENCOUNTER — TELEPHONE (OUTPATIENT)
Age: 88
End: 2023-10-30

## 2023-10-30 DIAGNOSIS — E55.9 VITAMIN D DEFICIENCY: ICD-10-CM

## 2023-10-30 DIAGNOSIS — D64.9 ANEMIA, UNSPECIFIED TYPE: Primary | ICD-10-CM

## 2023-10-30 NOTE — TELEPHONE ENCOUNTER
Patient states that he is due to get his Vitamin D shot and is wanting to know if he can get his flu shot at the same time as his vitamin D shot? Ok to schedule with a nurse for vitamin d shot?   Please advise

## 2023-10-31 NOTE — TELEPHONE ENCOUNTER
Told pt what you said he is going to [facility] tomorrow for an iron shot he didn't really seem to understand your message

## 2023-10-31 NOTE — TELEPHONE ENCOUNTER
We do not due Vitamin D shots. .. we do vitamin B12. ..     Please advise if you know what he is talking about

## 2023-10-31 NOTE — TELEPHONE ENCOUNTER
His last b12 level was normal in July  We should update levels first to see if he needs B12  B12 and D ordered  He should schedule his flu shot now

## 2023-11-13 ENCOUNTER — APPOINTMENT (OUTPATIENT)
Dept: GENERAL RADIOLOGY | Age: 88
DRG: 270 | End: 2023-11-13
Payer: MEDICARE

## 2023-11-13 ENCOUNTER — HOSPITAL ENCOUNTER (INPATIENT)
Age: 88
LOS: 1 days | Discharge: ANOTHER HOSPITAL | DRG: 270 | End: 2023-11-14
Attending: INTERNAL MEDICINE | Admitting: INTERNAL MEDICINE
Payer: MEDICARE

## 2023-11-13 ENCOUNTER — HOSPITAL ENCOUNTER (INPATIENT)
Age: 88
LOS: 1 days | Discharge: ANOTHER HOSPITAL | DRG: 270 | End: 2023-11-13
Attending: EMERGENCY MEDICINE | Admitting: FAMILY MEDICINE
Payer: MEDICARE

## 2023-11-13 VITALS
SYSTOLIC BLOOD PRESSURE: 123 MMHG | OXYGEN SATURATION: 95 % | HEIGHT: 71 IN | DIASTOLIC BLOOD PRESSURE: 85 MMHG | HEART RATE: 62 BPM | TEMPERATURE: 97 F | RESPIRATION RATE: 18 BRPM | WEIGHT: 154 LBS | BODY MASS INDEX: 21.56 KG/M2

## 2023-11-13 DIAGNOSIS — I21.02 ST ELEVATION MYOCARDIAL INFARCTION INVOLVING LEFT ANTERIOR DESCENDING (LAD) CORONARY ARTERY (HCC): Primary | ICD-10-CM

## 2023-11-13 DIAGNOSIS — I21.3 STEMI (ST ELEVATION MYOCARDIAL INFARCTION) (HCC): ICD-10-CM

## 2023-11-13 LAB
ANION GAP SERPL CALCULATED.3IONS-SCNC: 11 MMOL/L (ref 9–17)
ANTI-XA UNFRAC HEPARIN: 0.7 IU/L
ANTI-XA UNFRAC HEPARIN: 0.9 IU/L
BASOPHILS # BLD: 0 K/UL (ref 0–0.2)
BASOPHILS NFR BLD: 0 % (ref 0–2)
BUN SERPL-MCNC: 41 MG/DL (ref 8–23)
CALCIUM SERPL-MCNC: 9.5 MG/DL (ref 8.6–10.4)
CHLORIDE SERPL-SCNC: 99 MMOL/L (ref 98–107)
CO2 SERPL-SCNC: 29 MMOL/L (ref 20–31)
CREAT SERPL-MCNC: 1.7 MG/DL (ref 0.7–1.2)
EOSINOPHIL # BLD: 0.1 K/UL (ref 0–0.4)
EOSINOPHILS RELATIVE PERCENT: 2 % (ref 1–4)
ERYTHROCYTE [DISTWIDTH] IN BLOOD BY AUTOMATED COUNT: 13.5 % (ref 11.8–14.4)
ERYTHROCYTE [DISTWIDTH] IN BLOOD BY AUTOMATED COUNT: 14.3 % (ref 12.5–15.4)
GFR SERPL CREATININE-BSD FRML MDRD: 38 ML/MIN/1.73M2
GLUCOSE SERPL-MCNC: 137 MG/DL (ref 70–99)
HCT VFR BLD AUTO: 28.1 % (ref 41–53)
HCT VFR BLD AUTO: 32.5 % (ref 40.7–50.3)
HGB BLD-MCNC: 9.3 G/DL (ref 13.5–17.5)
HGB BLD-MCNC: 9.7 G/DL (ref 13–17)
INR PPP: 1.1
INR PPP: 2.4
LYMPHOCYTES NFR BLD: 0.8 K/UL (ref 1–4.8)
LYMPHOCYTES RELATIVE PERCENT: 13 % (ref 24–44)
MAGNESIUM SERPL-MCNC: 2.1 MG/DL (ref 1.6–2.6)
MCH RBC QN AUTO: 33.4 PG (ref 25.2–33.5)
MCH RBC QN AUTO: 34.5 PG (ref 26–34)
MCHC RBC AUTO-ENTMCNC: 29.8 G/DL (ref 28.4–34.8)
MCHC RBC AUTO-ENTMCNC: 33.1 G/DL (ref 31–37)
MCV RBC AUTO: 104.3 FL (ref 80–100)
MCV RBC AUTO: 112.1 FL (ref 82.6–102.9)
MONOCYTES NFR BLD: 0.8 K/UL (ref 0.1–1.2)
MONOCYTES NFR BLD: 12 % (ref 2–11)
NEUTROPHILS NFR BLD: 73 % (ref 36–66)
NEUTS SEG NFR BLD: 4.5 K/UL (ref 1.8–7.7)
NRBC BLD-RTO: 0 PER 100 WBC
PARTIAL THROMBOPLASTIN TIME: >180 SEC (ref 23–36.5)
PLATELET # BLD AUTO: 118 K/UL (ref 138–453)
PLATELET # BLD AUTO: 130 K/UL (ref 140–450)
PMV BLD AUTO: 10.7 FL (ref 8.1–13.5)
PMV BLD AUTO: 8.3 FL (ref 6–12)
POTASSIUM SERPL-SCNC: 4.5 MMOL/L (ref 3.7–5.3)
PROTHROMBIN TIME: 11.3 SEC (ref 9.4–12.6)
PROTHROMBIN TIME: 25.9 SEC (ref 11.7–14.9)
RBC # BLD AUTO: 2.69 M/UL (ref 4.5–5.9)
RBC # BLD AUTO: 2.9 M/UL (ref 4.21–5.77)
SODIUM SERPL-SCNC: 139 MMOL/L (ref 135–144)
TROPONIN I SERPL HS-MCNC: 313 NG/L (ref 0–22)
TROPONIN I SERPL HS-MCNC: 59 NG/L (ref 0–22)
TROPONIN I SERPL HS-MCNC: 59 NG/L (ref 0–22)
WBC OTHER # BLD: 6 K/UL (ref 3.5–11.3)
WBC OTHER # BLD: 6.2 K/UL (ref 3.5–11)

## 2023-11-13 PROCEDURE — 6360000002 HC RX W HCPCS: Performed by: EMERGENCY MEDICINE

## 2023-11-13 PROCEDURE — 85520 HEPARIN ASSAY: CPT

## 2023-11-13 PROCEDURE — 33967 INSERT I-AORT PERCUT DEVICE: CPT | Performed by: INTERNAL MEDICINE

## 2023-11-13 PROCEDURE — 84484 ASSAY OF TROPONIN QUANT: CPT

## 2023-11-13 PROCEDURE — 83735 ASSAY OF MAGNESIUM: CPT

## 2023-11-13 PROCEDURE — 99285 EMERGENCY DEPT VISIT HI MDM: CPT

## 2023-11-13 PROCEDURE — 33970 AORTIC CIRCULATION ASSIST: CPT | Performed by: INTERNAL MEDICINE

## 2023-11-13 PROCEDURE — 99152 MOD SED SAME PHYS/QHP 5/>YRS: CPT | Performed by: INTERNAL MEDICINE

## 2023-11-13 PROCEDURE — 2700000000 HC OXYGEN THERAPY PER DAY

## 2023-11-13 PROCEDURE — 71045 X-RAY EXAM CHEST 1 VIEW: CPT

## 2023-11-13 PROCEDURE — 2000000000 HC ICU R&B

## 2023-11-13 PROCEDURE — 96375 TX/PRO/DX INJ NEW DRUG ADDON: CPT

## 2023-11-13 PROCEDURE — 93005 ELECTROCARDIOGRAM TRACING: CPT | Performed by: STUDENT IN AN ORGANIZED HEALTH CARE EDUCATION/TRAINING PROGRAM

## 2023-11-13 PROCEDURE — C1769 GUIDE WIRE: HCPCS | Performed by: INTERNAL MEDICINE

## 2023-11-13 PROCEDURE — 1200000000 HC SEMI PRIVATE

## 2023-11-13 PROCEDURE — 93458 L HRT ARTERY/VENTRICLE ANGIO: CPT | Performed by: INTERNAL MEDICINE

## 2023-11-13 PROCEDURE — 36415 COLL VENOUS BLD VENIPUNCTURE: CPT

## 2023-11-13 PROCEDURE — 85025 COMPLETE CBC W/AUTO DIFF WBC: CPT

## 2023-11-13 PROCEDURE — 93005 ELECTROCARDIOGRAM TRACING: CPT | Performed by: EMERGENCY MEDICINE

## 2023-11-13 PROCEDURE — 6360000004 HC RX CONTRAST MEDICATION: Performed by: INTERNAL MEDICINE

## 2023-11-13 PROCEDURE — 51702 INSERT TEMP BLADDER CATH: CPT

## 2023-11-13 PROCEDURE — 80048 BASIC METABOLIC PNL TOTAL CA: CPT

## 2023-11-13 PROCEDURE — C1894 INTRO/SHEATH, NON-LASER: HCPCS | Performed by: INTERNAL MEDICINE

## 2023-11-13 PROCEDURE — 6370000000 HC RX 637 (ALT 250 FOR IP): Performed by: STUDENT IN AN ORGANIZED HEALTH CARE EDUCATION/TRAINING PROGRAM

## 2023-11-13 PROCEDURE — 2580000003 HC RX 258: Performed by: INTERNAL MEDICINE

## 2023-11-13 PROCEDURE — 99222 1ST HOSP IP/OBS MODERATE 55: CPT | Performed by: NURSE PRACTITIONER

## 2023-11-13 PROCEDURE — 7100000010 HC PHASE II RECOVERY - FIRST 15 MIN: Performed by: INTERNAL MEDICINE

## 2023-11-13 PROCEDURE — 85730 THROMBOPLASTIN TIME PARTIAL: CPT

## 2023-11-13 PROCEDURE — C1889 IMPLANT/INSERT DEVICE, NOC: HCPCS | Performed by: INTERNAL MEDICINE

## 2023-11-13 PROCEDURE — 85027 COMPLETE CBC AUTOMATED: CPT

## 2023-11-13 PROCEDURE — C1725 CATH, TRANSLUMIN NON-LASER: HCPCS | Performed by: INTERNAL MEDICINE

## 2023-11-13 PROCEDURE — 96374 THER/PROPH/DIAG INJ IV PUSH: CPT

## 2023-11-13 PROCEDURE — 6360000002 HC RX W HCPCS

## 2023-11-13 PROCEDURE — 2500000003 HC RX 250 WO HCPCS: Performed by: INTERNAL MEDICINE

## 2023-11-13 PROCEDURE — 99221 1ST HOSP IP/OBS SF/LOW 40: CPT | Performed by: INTERNAL MEDICINE

## 2023-11-13 PROCEDURE — 7100000011 HC PHASE II RECOVERY - ADDTL 15 MIN: Performed by: INTERNAL MEDICINE

## 2023-11-13 PROCEDURE — 6360000002 HC RX W HCPCS: Performed by: STUDENT IN AN ORGANIZED HEALTH CARE EDUCATION/TRAINING PROGRAM

## 2023-11-13 PROCEDURE — 85610 PROTHROMBIN TIME: CPT

## 2023-11-13 PROCEDURE — 2709999900 HC NON-CHARGEABLE SUPPLY: Performed by: INTERNAL MEDICINE

## 2023-11-13 PROCEDURE — 99153 MOD SED SAME PHYS/QHP EA: CPT | Performed by: INTERNAL MEDICINE

## 2023-11-13 PROCEDURE — 6360000002 HC RX W HCPCS: Performed by: INTERNAL MEDICINE

## 2023-11-13 RX ORDER — SODIUM CHLORIDE 9 MG/ML
INJECTION, SOLUTION INTRAVENOUS CONTINUOUS
Status: DISCONTINUED | OUTPATIENT
Start: 2023-11-13 | End: 2023-11-14 | Stop reason: HOSPADM

## 2023-11-13 RX ORDER — POLYETHYLENE GLYCOL 3350 17 G/17G
17 POWDER, FOR SOLUTION ORAL DAILY PRN
Status: DISCONTINUED | OUTPATIENT
Start: 2023-11-13 | End: 2023-11-13 | Stop reason: HOSPADM

## 2023-11-13 RX ORDER — POTASSIUM CHLORIDE 20 MEQ/1
40 TABLET, EXTENDED RELEASE ORAL PRN
Status: DISCONTINUED | OUTPATIENT
Start: 2023-11-13 | End: 2023-11-13 | Stop reason: HOSPADM

## 2023-11-13 RX ORDER — ACETAMINOPHEN 325 MG/1
650 TABLET ORAL EVERY 4 HOURS PRN
Status: DISCONTINUED | OUTPATIENT
Start: 2023-11-13 | End: 2023-11-13 | Stop reason: SDUPTHER

## 2023-11-13 RX ORDER — ONDANSETRON 2 MG/ML
4 INJECTION INTRAMUSCULAR; INTRAVENOUS EVERY 6 HOURS PRN
Status: DISCONTINUED | OUTPATIENT
Start: 2023-11-13 | End: 2023-11-13 | Stop reason: SDUPTHER

## 2023-11-13 RX ORDER — METOPROLOL TARTRATE 50 MG/1
50 TABLET, FILM COATED ORAL 2 TIMES DAILY
Status: DISCONTINUED | OUTPATIENT
Start: 2023-11-13 | End: 2023-11-14 | Stop reason: HOSPADM

## 2023-11-13 RX ORDER — SODIUM CHLORIDE 0.9 % (FLUSH) 0.9 %
5-40 SYRINGE (ML) INJECTION PRN
Status: DISCONTINUED | OUTPATIENT
Start: 2023-11-13 | End: 2023-11-14 | Stop reason: HOSPADM

## 2023-11-13 RX ORDER — ESCITALOPRAM OXALATE 10 MG/1
10 TABLET ORAL DAILY
Status: DISCONTINUED | OUTPATIENT
Start: 2023-11-13 | End: 2023-11-14 | Stop reason: HOSPADM

## 2023-11-13 RX ORDER — SODIUM CHLORIDE 0.9 % (FLUSH) 0.9 %
5-40 SYRINGE (ML) INJECTION EVERY 12 HOURS SCHEDULED
Status: CANCELLED | OUTPATIENT
Start: 2023-11-13

## 2023-11-13 RX ORDER — ONDANSETRON 4 MG/1
4 TABLET, ORALLY DISINTEGRATING ORAL EVERY 8 HOURS PRN
Status: DISCONTINUED | OUTPATIENT
Start: 2023-11-13 | End: 2023-11-13 | Stop reason: HOSPADM

## 2023-11-13 RX ORDER — VITAMIN B COMPLEX
2 TABLET ORAL EVERY OTHER DAY
Status: DISCONTINUED | OUTPATIENT
Start: 2023-11-13 | End: 2023-11-14 | Stop reason: HOSPADM

## 2023-11-13 RX ORDER — LIDOCAINE HYDROCHLORIDE ANHYDROUS AND DEXTROSE MONOHYDRATE 5; 400 G/100ML; MG/100ML
1 INJECTION, SOLUTION INTRAVENOUS CONTINUOUS
Status: DISCONTINUED | OUTPATIENT
Start: 2023-11-13 | End: 2023-11-14 | Stop reason: HOSPADM

## 2023-11-13 RX ORDER — POTASSIUM CHLORIDE 29.8 MG/ML
20 INJECTION INTRAVENOUS PRN
Status: DISCONTINUED | OUTPATIENT
Start: 2023-11-13 | End: 2023-11-14 | Stop reason: HOSPADM

## 2023-11-13 RX ORDER — SIMVASTATIN 10 MG
10 TABLET ORAL NIGHTLY
Status: DISCONTINUED | OUTPATIENT
Start: 2023-11-13 | End: 2023-11-14

## 2023-11-13 RX ORDER — HEPARIN SODIUM 1000 [USP'U]/ML
4000 INJECTION, SOLUTION INTRAVENOUS; SUBCUTANEOUS PRN
Status: DISCONTINUED | OUTPATIENT
Start: 2023-11-13 | End: 2023-11-14 | Stop reason: HOSPADM

## 2023-11-13 RX ORDER — MAGNESIUM SULFATE IN WATER 40 MG/ML
2000 INJECTION, SOLUTION INTRAVENOUS PRN
Status: DISCONTINUED | OUTPATIENT
Start: 2023-11-13 | End: 2023-11-14 | Stop reason: HOSPADM

## 2023-11-13 RX ORDER — LIDOCAINE HYDROCHLORIDE 10 MG/ML
INJECTION, SOLUTION INFILTRATION; PERINEURAL PRN
Status: DISCONTINUED | OUTPATIENT
Start: 2023-11-13 | End: 2023-11-13 | Stop reason: HOSPADM

## 2023-11-13 RX ORDER — MORPHINE SULFATE 2 MG/ML
INJECTION, SOLUTION INTRAMUSCULAR; INTRAVENOUS
Status: COMPLETED
Start: 2023-11-13 | End: 2023-11-13

## 2023-11-13 RX ORDER — MIDAZOLAM HYDROCHLORIDE 1 MG/ML
INJECTION INTRAMUSCULAR; INTRAVENOUS PRN
Status: DISCONTINUED | OUTPATIENT
Start: 2023-11-13 | End: 2023-11-13 | Stop reason: HOSPADM

## 2023-11-13 RX ORDER — HEPARIN SODIUM 1000 [USP'U]/ML
4000 INJECTION, SOLUTION INTRAVENOUS; SUBCUTANEOUS ONCE
Status: COMPLETED | OUTPATIENT
Start: 2023-11-13 | End: 2023-11-13

## 2023-11-13 RX ORDER — SODIUM CHLORIDE 9 MG/ML
INJECTION, SOLUTION INTRAVENOUS PRN
Status: CANCELLED | OUTPATIENT
Start: 2023-11-13

## 2023-11-13 RX ORDER — SODIUM CHLORIDE 9 MG/ML
INJECTION, SOLUTION INTRAVENOUS PRN
Status: DISCONTINUED | OUTPATIENT
Start: 2023-11-13 | End: 2023-11-13 | Stop reason: HOSPADM

## 2023-11-13 RX ORDER — SODIUM CHLORIDE 0.9 % (FLUSH) 0.9 %
5-40 SYRINGE (ML) INJECTION EVERY 12 HOURS SCHEDULED
Status: DISCONTINUED | OUTPATIENT
Start: 2023-11-13 | End: 2023-11-14 | Stop reason: HOSPADM

## 2023-11-13 RX ORDER — SODIUM CHLORIDE 9 MG/ML
INJECTION, SOLUTION INTRAVENOUS PRN
Status: DISCONTINUED | OUTPATIENT
Start: 2023-11-13 | End: 2023-11-14 | Stop reason: HOSPADM

## 2023-11-13 RX ORDER — HEPARIN SODIUM 10000 [USP'U]/100ML
5-30 INJECTION, SOLUTION INTRAVENOUS CONTINUOUS
Status: DISCONTINUED | OUTPATIENT
Start: 2023-11-13 | End: 2023-11-14 | Stop reason: HOSPADM

## 2023-11-13 RX ORDER — SODIUM CHLORIDE 0.9 % (FLUSH) 0.9 %
5-40 SYRINGE (ML) INJECTION PRN
Status: DISCONTINUED | OUTPATIENT
Start: 2023-11-13 | End: 2023-11-13 | Stop reason: HOSPADM

## 2023-11-13 RX ORDER — BIVALIRUDIN 250 MG/5ML
INJECTION, POWDER, LYOPHILIZED, FOR SOLUTION INTRAVENOUS PRN
Status: DISCONTINUED | OUTPATIENT
Start: 2023-11-13 | End: 2023-11-13 | Stop reason: HOSPADM

## 2023-11-13 RX ORDER — ONDANSETRON 2 MG/ML
4 INJECTION INTRAMUSCULAR; INTRAVENOUS EVERY 6 HOURS PRN
Status: CANCELLED | OUTPATIENT
Start: 2023-11-13

## 2023-11-13 RX ORDER — TAMSULOSIN HYDROCHLORIDE 0.4 MG/1
0.4 CAPSULE ORAL DAILY
Status: DISCONTINUED | OUTPATIENT
Start: 2023-11-13 | End: 2023-11-14 | Stop reason: HOSPADM

## 2023-11-13 RX ORDER — ONDANSETRON 2 MG/ML
4 INJECTION INTRAMUSCULAR; INTRAVENOUS EVERY 6 HOURS PRN
Status: DISCONTINUED | OUTPATIENT
Start: 2023-11-13 | End: 2023-11-14 | Stop reason: HOSPADM

## 2023-11-13 RX ORDER — MAGNESIUM SULFATE IN WATER 40 MG/ML
2000 INJECTION, SOLUTION INTRAVENOUS ONCE
Status: COMPLETED | OUTPATIENT
Start: 2023-11-13 | End: 2023-11-14

## 2023-11-13 RX ORDER — ACETAMINOPHEN 325 MG/1
650 TABLET ORAL EVERY 6 HOURS PRN
Status: DISCONTINUED | OUTPATIENT
Start: 2023-11-13 | End: 2023-11-13 | Stop reason: HOSPADM

## 2023-11-13 RX ORDER — ACETAMINOPHEN 650 MG/1
650 SUPPOSITORY RECTAL EVERY 6 HOURS PRN
Status: DISCONTINUED | OUTPATIENT
Start: 2023-11-13 | End: 2023-11-13 | Stop reason: HOSPADM

## 2023-11-13 RX ORDER — ONDANSETRON 4 MG/1
4 TABLET, ORALLY DISINTEGRATING ORAL EVERY 8 HOURS PRN
Status: DISCONTINUED | OUTPATIENT
Start: 2023-11-13 | End: 2023-11-14 | Stop reason: HOSPADM

## 2023-11-13 RX ORDER — ACETAMINOPHEN 325 MG/1
650 TABLET ORAL EVERY 4 HOURS PRN
Status: CANCELLED | OUTPATIENT
Start: 2023-11-13

## 2023-11-13 RX ORDER — ONDANSETRON 2 MG/ML
4 INJECTION INTRAMUSCULAR; INTRAVENOUS EVERY 6 HOURS PRN
Status: DISCONTINUED | OUTPATIENT
Start: 2023-11-13 | End: 2023-11-13 | Stop reason: HOSPADM

## 2023-11-13 RX ORDER — SODIUM CHLORIDE 0.9 % (FLUSH) 0.9 %
5-40 SYRINGE (ML) INJECTION PRN
Status: CANCELLED | OUTPATIENT
Start: 2023-11-13

## 2023-11-13 RX ORDER — POTASSIUM CHLORIDE 7.45 MG/ML
10 INJECTION INTRAVENOUS PRN
Status: DISCONTINUED | OUTPATIENT
Start: 2023-11-13 | End: 2023-11-13 | Stop reason: HOSPADM

## 2023-11-13 RX ORDER — ALBUTEROL SULFATE 2.5 MG/.5ML
2.5 SOLUTION RESPIRATORY (INHALATION) EVERY 6 HOURS PRN
Status: DISCONTINUED | OUTPATIENT
Start: 2023-11-13 | End: 2023-11-14 | Stop reason: HOSPADM

## 2023-11-13 RX ORDER — FENTANYL CITRATE 50 UG/ML
25 INJECTION, SOLUTION INTRAMUSCULAR; INTRAVENOUS ONCE
Status: COMPLETED | OUTPATIENT
Start: 2023-11-13 | End: 2023-11-13

## 2023-11-13 RX ORDER — HEPARIN SODIUM 10000 [USP'U]/100ML
5-30 INJECTION, SOLUTION INTRAVENOUS CONTINUOUS
Status: DISCONTINUED | OUTPATIENT
Start: 2023-11-13 | End: 2023-11-13 | Stop reason: HOSPADM

## 2023-11-13 RX ORDER — POLYETHYLENE GLYCOL 3350 17 G/17G
17 POWDER, FOR SOLUTION ORAL DAILY PRN
Status: DISCONTINUED | OUTPATIENT
Start: 2023-11-13 | End: 2023-11-14 | Stop reason: HOSPADM

## 2023-11-13 RX ORDER — SODIUM CHLORIDE 0.9 % (FLUSH) 0.9 %
5-40 SYRINGE (ML) INJECTION EVERY 12 HOURS SCHEDULED
Status: DISCONTINUED | OUTPATIENT
Start: 2023-11-13 | End: 2023-11-13 | Stop reason: HOSPADM

## 2023-11-13 RX ORDER — POTASSIUM CHLORIDE 7.45 MG/ML
10 INJECTION INTRAVENOUS PRN
Status: DISCONTINUED | OUTPATIENT
Start: 2023-11-13 | End: 2023-11-14 | Stop reason: HOSPADM

## 2023-11-13 RX ORDER — MORPHINE SULFATE 2 MG/ML
2 INJECTION, SOLUTION INTRAMUSCULAR; INTRAVENOUS EVERY 4 HOURS PRN
Status: DISCONTINUED | OUTPATIENT
Start: 2023-11-13 | End: 2023-11-14 | Stop reason: HOSPADM

## 2023-11-13 RX ORDER — HEPARIN SODIUM 1000 [USP'U]/ML
2000 INJECTION, SOLUTION INTRAVENOUS; SUBCUTANEOUS PRN
Status: DISCONTINUED | OUTPATIENT
Start: 2023-11-13 | End: 2023-11-14 | Stop reason: HOSPADM

## 2023-11-13 RX ORDER — SODIUM CHLORIDE 9 MG/ML
INJECTION, SOLUTION INTRAVENOUS CONTINUOUS
Status: CANCELLED | OUTPATIENT
Start: 2023-11-13

## 2023-11-13 RX ORDER — ACETAMINOPHEN 650 MG/1
650 SUPPOSITORY RECTAL EVERY 6 HOURS PRN
Status: DISCONTINUED | OUTPATIENT
Start: 2023-11-13 | End: 2023-11-14 | Stop reason: HOSPADM

## 2023-11-13 RX ORDER — ACETAMINOPHEN 325 MG/1
650 TABLET ORAL EVERY 6 HOURS PRN
Status: DISCONTINUED | OUTPATIENT
Start: 2023-11-13 | End: 2023-11-14 | Stop reason: HOSPADM

## 2023-11-13 RX ORDER — CHOLECALCIFEROL (VITAMIN D3) 125 MCG
2000 CAPSULE ORAL DAILY
Status: DISCONTINUED | OUTPATIENT
Start: 2023-11-13 | End: 2023-11-14 | Stop reason: HOSPADM

## 2023-11-13 RX ORDER — MAGNESIUM SULFATE IN WATER 40 MG/ML
2000 INJECTION, SOLUTION INTRAVENOUS PRN
Status: DISCONTINUED | OUTPATIENT
Start: 2023-11-13 | End: 2023-11-13 | Stop reason: HOSPADM

## 2023-11-13 RX ADMIN — MAGNESIUM SULFATE HEPTAHYDRATE 2000 MG: 40 INJECTION, SOLUTION INTRAVENOUS at 21:20

## 2023-11-13 RX ADMIN — MORPHINE SULFATE 2 MG: 2 INJECTION, SOLUTION INTRAMUSCULAR; INTRAVENOUS at 15:36

## 2023-11-13 RX ADMIN — METOPROLOL TARTRATE 50 MG: 50 TABLET, FILM COATED ORAL at 21:13

## 2023-11-13 RX ADMIN — HEPARIN SODIUM 12 UNITS/KG/HR: 10000 INJECTION, SOLUTION INTRAVENOUS at 15:23

## 2023-11-13 RX ADMIN — LEVOTHYROXINE SODIUM 125 MCG: 75 TABLET ORAL at 17:19

## 2023-11-13 RX ADMIN — Medication 2000 UNITS: at 17:19

## 2023-11-13 RX ADMIN — SODIUM CHLORIDE: 9 INJECTION, SOLUTION INTRAVENOUS at 15:28

## 2023-11-13 RX ADMIN — HEPARIN SODIUM 12 UNITS/KG/HR: 10000 INJECTION, SOLUTION INTRAVENOUS at 11:32

## 2023-11-13 RX ADMIN — HEPARIN SODIUM 4000 UNITS: 1000 INJECTION INTRAVENOUS; SUBCUTANEOUS at 11:31

## 2023-11-13 RX ADMIN — FENTANYL CITRATE 25 MCG: 50 INJECTION, SOLUTION INTRAMUSCULAR; INTRAVENOUS at 11:20

## 2023-11-13 RX ADMIN — LIDOCAINE HYDROCHLORIDE ANHYDROUS AND DEXTROSE MONOHYDRATE 1 MG/MIN: .4; 5 INJECTION, SOLUTION INTRAVENOUS at 21:39

## 2023-11-13 RX ADMIN — TAMSULOSIN HYDROCHLORIDE 0.4 MG: 0.4 CAPSULE ORAL at 17:19

## 2023-11-13 RX ADMIN — ESCITALOPRAM 10 MG: 10 TABLET, FILM COATED ORAL at 17:18

## 2023-11-13 RX ADMIN — HEPARIN SODIUM 4000 UNITS: 1000 INJECTION INTRAVENOUS; SUBCUTANEOUS at 15:23

## 2023-11-13 RX ADMIN — SIMVASTATIN 10 MG: 10 TABLET, FILM COATED ORAL at 21:13

## 2023-11-13 ASSESSMENT — PAIN DESCRIPTION - LOCATION
LOCATION: CHEST

## 2023-11-13 ASSESSMENT — PAIN SCALES - GENERAL
PAINLEVEL_OUTOF10: 8
PAINLEVEL_OUTOF10: 3
PAINLEVEL_OUTOF10: 8

## 2023-11-13 ASSESSMENT — PAIN DESCRIPTION - ORIENTATION: ORIENTATION: MID

## 2023-11-13 ASSESSMENT — PAIN - FUNCTIONAL ASSESSMENT: PAIN_FUNCTIONAL_ASSESSMENT: 0-10

## 2023-11-13 NOTE — H&P
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 RECOMMENDATIONS:  Proceed with emergent cath. Discussed with patient and Nurse.     María Toro MD, MD  [facility] Cardiology Consult           [phone]

## 2023-11-13 NOTE — PLAN OF CARE
Patient discussed with the [facility] transfer line. They will call back after discussing with the cardiac ICU. Face sheet to be faxed on [phone].

## 2023-11-13 NOTE — PROGRESS NOTES
[facility]     Stroke/STEMI/Arrest Alert Note    PATIENT NAME: Liberty Palma    Room # [facility]-IR/IR   Name: Liberty Palma            Age: 80 y.o. Gender: male          Synagogue: Non-Oriental orthodox    Place of Nondenominational:      Alert type: Stemi Alert    Admit Date & Time: 11/13/2023 10:38 AM    ADVANCE DIRECTIVES IN CHART? No    NAME OF DECISION MAKER: unknown    RELATIONSHIP OF DECISION MAKER TO PATIENT: n/a    PATIENT/EVENT DESCRIPTION:  Liberty Palma is a 80 y.o. male who arrived as STEMI. Pt to be admitted to [facility]-IR/IR. SPIRITUAL ASSESSMENT/INTERVENTION:  This writer responded to [facility] STEMI alert. Patient not available. Patient was being attended to by multi-disciplinary team. This writer provided ministry of presence and support to staff. No family was present with patient. PATIENT BELONGINGS:  This writer did not handle patient's belongings     ANY BELONGINGS OF SIGNIFICANT VALUE NOTED:  N/a     REGISTRATION STAFF NOTIFIED?   No      WHAT IS YOUR SPIRITUAL CARE PLAN FOR THIS PATIENT?:   Chaplains will remain available to follow up with patient and/or family as needed     Electronically signed by Jenniffer Fields on 11/13/2023 at 12:38 PM.  [id number] -      11/13/23 1237   Encounter Summary   Encounter Overview/Reason  Crisis   Service Provided For: Patient not available   Referral/Consult From: Multi-disciplinary team   Support System Children   Last Encounter  11/13/23   Complexity of Encounter Low   Begin Time 1130   End Time  1140   Total Time Calculated 10 min   Spiritual/Emotional needs   Type Spiritual Support   Assessment/Intervention/Outcome   Assessment Unable to assess  (pt taken to cath lab)   Intervention Sustaining Presence/Ministry of presence   Plan and Referrals   Plan/Referrals Continue Support (comment)

## 2023-11-13 NOTE — PLAN OF CARE
Patient seen and examined at the bed side. Patient had anterior STEMI today and have severe calcification of ostial and proximal LAD and could not crossed with multiple attempts. Patient also have 75% LCx, 75 % OM1 and 60% mid RCA stenosis. CT surgery was consulted but recommended high risk PCI. As we were unable to cross wire in first and have aneurysmal dilatation of LAD, therefore, we will transfer the patient to [address] [place] [address]. Mirza Villarreal MD.  Cardiovascular Fellow,   [place], [place].

## 2023-11-13 NOTE — PROGRESS NOTES
Patient transported to [facility] via [facility]. Verbal report given to transport crew.

## 2023-11-14 ENCOUNTER — APPOINTMENT (OUTPATIENT)
Age: 88
DRG: 270 | End: 2023-11-14
Attending: INTERNAL MEDICINE
Payer: MEDICARE

## 2023-11-14 ENCOUNTER — APPOINTMENT (OUTPATIENT)
Dept: GENERAL RADIOLOGY | Age: 88
DRG: 270 | End: 2023-11-14
Attending: INTERNAL MEDICINE
Payer: MEDICARE

## 2023-11-14 VITALS
TEMPERATURE: 98.8 F | HEART RATE: 61 BPM | WEIGHT: 158.29 LBS | RESPIRATION RATE: 18 BRPM | HEIGHT: 71 IN | DIASTOLIC BLOOD PRESSURE: 31 MMHG | BODY MASS INDEX: 22.16 KG/M2 | OXYGEN SATURATION: 100 % | SYSTOLIC BLOOD PRESSURE: 92 MMHG

## 2023-11-14 PROBLEM — I35.0 AORTIC VALVE STENOSIS: Status: ACTIVE | Noted: 2023-11-14

## 2023-11-14 PROBLEM — N17.9 AKI (ACUTE KIDNEY INJURY) (HCC): Status: ACTIVE | Noted: 2023-11-14

## 2023-11-14 PROBLEM — I25.10 TRIPLE VESSEL DISEASE OF THE HEART: Status: ACTIVE | Noted: 2023-11-14

## 2023-11-14 LAB
ALLEN TEST: ABNORMAL
ANION GAP SERPL CALCULATED.3IONS-SCNC: 11 MMOL/L (ref 9–17)
ANTI-XA UNFRAC HEPARIN: 0.48 IU/L
BASOPHILS # BLD: 0 K/UL (ref 0–0.2)
BASOPHILS NFR BLD: 0 % (ref 0–2)
BUN SERPL-MCNC: 41 MG/DL (ref 8–23)
CALCIUM SERPL-MCNC: 9 MG/DL (ref 8.6–10.4)
CHLORIDE SERPL-SCNC: 100 MMOL/L (ref 98–107)
CO2 SERPL-SCNC: 25 MMOL/L (ref 20–31)
CREAT SERPL-MCNC: 1.9 MG/DL (ref 0.7–1.2)
EKG ATRIAL RATE: 241 BPM
EKG ATRIAL RATE: 267 BPM
EKG ATRIAL RATE: 326 BPM
EKG ATRIAL RATE: 66 BPM
EKG Q-T INTERVAL: 422 MS
EKG Q-T INTERVAL: 466 MS
EKG Q-T INTERVAL: 490 MS
EKG Q-T INTERVAL: 492 MS
EKG QRS DURATION: 104 MS
EKG QRS DURATION: 186 MS
EKG QRS DURATION: 190 MS
EKG QRS DURATION: 192 MS
EKG QTC CALCULATION (BAZETT): 442 MS
EKG QTC CALCULATION (BAZETT): 495 MS
EKG QTC CALCULATION (BAZETT): 505 MS
EKG QTC CALCULATION (BAZETT): 513 MS
EKG R AXIS: -103 DEGREES
EKG R AXIS: -111 DEGREES
EKG R AXIS: -112 DEGREES
EKG R AXIS: -121 DEGREES
EKG T AXIS: -106 DEGREES
EKG T AXIS: 72 DEGREES
EKG T AXIS: 73 DEGREES
EKG T AXIS: 75 DEGREES
EKG VENTRICULAR RATE: 61 BPM
EKG VENTRICULAR RATE: 64 BPM
EKG VENTRICULAR RATE: 66 BPM
EKG VENTRICULAR RATE: 73 BPM
EOSINOPHIL # BLD: 0 K/UL (ref 0–0.44)
EOSINOPHILS RELATIVE PERCENT: 0 % (ref 1–4)
ERYTHROCYTE [DISTWIDTH] IN BLOOD BY AUTOMATED COUNT: 14 % (ref 11.8–14.4)
FIO2: 36
GFR SERPL CREATININE-BSD FRML MDRD: 34 ML/MIN/1.73M2
GLUCOSE BLD-MCNC: 77 MG/DL (ref 74–100)
GLUCOSE SERPL-MCNC: 137 MG/DL (ref 70–99)
HCT VFR BLD AUTO: 30.2 % (ref 40.7–50.3)
HGB BLD-MCNC: 9.2 G/DL (ref 13–17)
IMM GRANULOCYTES # BLD AUTO: 0 K/UL (ref 0–0.3)
IMM GRANULOCYTES NFR BLD: 0 %
INR PPP: 1.4
LYMPHOCYTES NFR BLD: 0.36 K/UL (ref 1.1–3.7)
LYMPHOCYTES RELATIVE PERCENT: 5 % (ref 24–43)
MCH RBC QN AUTO: 33.9 PG (ref 25.2–33.5)
MCHC RBC AUTO-ENTMCNC: 30.5 G/DL (ref 28.4–34.8)
MCV RBC AUTO: 111.4 FL (ref 82.6–102.9)
MONOCYTES NFR BLD: 0.79 K/UL (ref 0.1–1.2)
MONOCYTES NFR BLD: 11 % (ref 3–12)
MORPHOLOGY: ABNORMAL
NEUTROPHILS NFR BLD: 84 % (ref 36–65)
NEUTS SEG NFR BLD: 6.05 K/UL (ref 1.5–8.1)
NRBC BLD-RTO: 0 PER 100 WBC
O2 DELIVERY DEVICE: ABNORMAL
PLATELET # BLD AUTO: ABNORMAL K/UL (ref 138–453)
PLATELET, FLUORESCENCE: 126 K/UL (ref 138–453)
PLATELETS.RETICULATED NFR BLD AUTO: 4 % (ref 1.1–10.3)
POC HCO3: 26.2 MMOL/L (ref 21–28)
POC O2 SATURATION: 94.6 % (ref 94–98)
POC PCO2: 43.3 MM HG (ref 35–48)
POC PH: 7.39 (ref 7.35–7.45)
POC PO2: 74.9 MM HG (ref 83–108)
POSITIVE BASE EXCESS, ART: 1 MMOL/L (ref 0–3)
POTASSIUM SERPL-SCNC: 4.5 MMOL/L (ref 3.7–5.3)
POTASSIUM SERPL-SCNC: 5.9 MMOL/L (ref 3.7–5.3)
PROTHROMBIN TIME: 16.4 SEC (ref 11.7–14.9)
RBC # BLD AUTO: 2.71 M/UL (ref 4.21–5.77)
SAMPLE SITE: ABNORMAL
SODIUM SERPL-SCNC: 136 MMOL/L (ref 135–144)
WBC OTHER # BLD: 7.2 K/UL (ref 3.5–11.3)

## 2023-11-14 PROCEDURE — 93010 ELECTROCARDIOGRAM REPORT: CPT | Performed by: INTERNAL MEDICINE

## 2023-11-14 PROCEDURE — 85520 HEPARIN ASSAY: CPT

## 2023-11-14 PROCEDURE — 2700000000 HC OXYGEN THERAPY PER DAY

## 2023-11-14 PROCEDURE — 6370000000 HC RX 637 (ALT 250 FOR IP): Performed by: INTERNAL MEDICINE

## 2023-11-14 PROCEDURE — 85610 PROTHROMBIN TIME: CPT

## 2023-11-14 PROCEDURE — C8929 TTE W OR WO FOL WCON,DOPPLER: HCPCS

## 2023-11-14 PROCEDURE — 6370000000 HC RX 637 (ALT 250 FOR IP): Performed by: STUDENT IN AN ORGANIZED HEALTH CARE EDUCATION/TRAINING PROGRAM

## 2023-11-14 PROCEDURE — 93306 TTE W/DOPPLER COMPLETE: CPT | Performed by: INTERNAL MEDICINE

## 2023-11-14 PROCEDURE — 82803 BLOOD GASES ANY COMBINATION: CPT

## 2023-11-14 PROCEDURE — 85055 RETICULATED PLATELET ASSAY: CPT

## 2023-11-14 PROCEDURE — 2580000003 HC RX 258: Performed by: INTERNAL MEDICINE

## 2023-11-14 PROCEDURE — 5A02210 ASSISTANCE WITH CARDIAC OUTPUT USING BALLOON PUMP, CONTINUOUS: ICD-10-PCS | Performed by: INTERNAL MEDICINE

## 2023-11-14 PROCEDURE — 99223 1ST HOSP IP/OBS HIGH 75: CPT | Performed by: INTERNAL MEDICINE

## 2023-11-14 PROCEDURE — 99233 SBSQ HOSP IP/OBS HIGH 50: CPT | Performed by: INTERNAL MEDICINE

## 2023-11-14 PROCEDURE — 4A023N8 MEASUREMENT OF CARDIAC SAMPLING AND PRESSURE, BILATERAL, PERCUTANEOUS APPROACH: ICD-10-PCS | Performed by: INTERNAL MEDICINE

## 2023-11-14 PROCEDURE — 84132 ASSAY OF SERUM POTASSIUM: CPT

## 2023-11-14 PROCEDURE — 80048 BASIC METABOLIC PNL TOTAL CA: CPT

## 2023-11-14 PROCEDURE — 2500000003 HC RX 250 WO HCPCS

## 2023-11-14 PROCEDURE — 2580000003 HC RX 258: Performed by: STUDENT IN AN ORGANIZED HEALTH CARE EDUCATION/TRAINING PROGRAM

## 2023-11-14 PROCEDURE — 82947 ASSAY GLUCOSE BLOOD QUANT: CPT

## 2023-11-14 PROCEDURE — 93005 ELECTROCARDIOGRAM TRACING: CPT | Performed by: STUDENT IN AN ORGANIZED HEALTH CARE EDUCATION/TRAINING PROGRAM

## 2023-11-14 PROCEDURE — 36415 COLL VENOUS BLD VENIPUNCTURE: CPT

## 2023-11-14 PROCEDURE — 94761 N-INVAS EAR/PLS OXIMETRY MLT: CPT

## 2023-11-14 PROCEDURE — B2111ZZ FLUOROSCOPY OF MULTIPLE CORONARY ARTERIES USING LOW OSMOLAR CONTRAST: ICD-10-PCS | Performed by: INTERNAL MEDICINE

## 2023-11-14 PROCEDURE — 71045 X-RAY EXAM CHEST 1 VIEW: CPT

## 2023-11-14 PROCEDURE — 85025 COMPLETE CBC W/AUTO DIFF WBC: CPT

## 2023-11-14 RX ORDER — ASPIRIN 81 MG/1
81 TABLET, CHEWABLE ORAL DAILY
Status: DISCONTINUED | OUTPATIENT
Start: 2023-11-14 | End: 2023-11-14 | Stop reason: HOSPADM

## 2023-11-14 RX ORDER — SIMVASTATIN 40 MG
40 TABLET ORAL NIGHTLY
Qty: 30 TABLET | Refills: 3 | Status: SHIPPED | OUTPATIENT
Start: 2023-11-14

## 2023-11-14 RX ORDER — SIMVASTATIN 40 MG
40 TABLET ORAL NIGHTLY
Status: DISCONTINUED | OUTPATIENT
Start: 2023-11-14 | End: 2023-11-14 | Stop reason: HOSPADM

## 2023-11-14 RX ORDER — LIDOCAINE HYDROCHLORIDE ANHYDROUS AND DEXTROSE MONOHYDRATE 5; 400 G/100ML; MG/100ML
1 INJECTION, SOLUTION INTRAVENOUS CONTINUOUS
Qty: 500 ML | Refills: 0 | Status: SHIPPED | OUTPATIENT
Start: 2023-11-14

## 2023-11-14 RX ORDER — NOREPINEPHRINE BITARTRATE 0.06 MG/ML
INJECTION, SOLUTION INTRAVENOUS
Status: COMPLETED
Start: 2023-11-14 | End: 2023-11-14

## 2023-11-14 RX ORDER — DEXTROSE MONOHYDRATE 100 MG/ML
INJECTION, SOLUTION INTRAVENOUS CONTINUOUS PRN
Status: DISCONTINUED | OUTPATIENT
Start: 2023-11-14 | End: 2023-11-14 | Stop reason: HOSPADM

## 2023-11-14 RX ORDER — NOREPINEPHRINE BITARTRATE 0.06 MG/ML
1-100 INJECTION, SOLUTION INTRAVENOUS CONTINUOUS
Status: DISCONTINUED | OUTPATIENT
Start: 2023-11-14 | End: 2023-11-14 | Stop reason: HOSPADM

## 2023-11-14 RX ORDER — HEPARIN SODIUM 10000 [USP'U]/100ML
5-30 INJECTION, SOLUTION INTRAVENOUS CONTINUOUS
Qty: 100 ML | Refills: 0 | Status: SHIPPED | OUTPATIENT
Start: 2023-11-14

## 2023-11-14 RX ORDER — NOREPINEPHRINE BITARTRATE 0.06 MG/ML
1-100 INJECTION, SOLUTION INTRAVENOUS CONTINUOUS
Qty: 250 ML | Refills: 0 | Status: SHIPPED | OUTPATIENT
Start: 2023-11-14

## 2023-11-14 RX ORDER — ASPIRIN 81 MG/1
81 TABLET, CHEWABLE ORAL DAILY
Qty: 30 TABLET | Refills: 3 | Status: SHIPPED | OUTPATIENT
Start: 2023-11-15

## 2023-11-14 RX ADMIN — SODIUM CHLORIDE, PRESERVATIVE FREE 10 ML: 5 INJECTION INTRAVENOUS at 08:22

## 2023-11-14 RX ADMIN — ASPIRIN 81 MG: 81 TABLET, CHEWABLE ORAL at 09:03

## 2023-11-14 RX ADMIN — SODIUM ZIRCONIUM CYCLOSILICATE 10 G: 10 POWDER, FOR SUSPENSION ORAL at 09:34

## 2023-11-14 RX ADMIN — Medication 2000 MCG: at 08:49

## 2023-11-14 RX ADMIN — SODIUM CHLORIDE: 9 INJECTION, SOLUTION INTRAVENOUS at 10:05

## 2023-11-14 RX ADMIN — ESCITALOPRAM 10 MG: 10 TABLET, FILM COATED ORAL at 08:49

## 2023-11-14 RX ADMIN — Medication 2 MCG/MIN: at 11:45

## 2023-11-14 RX ADMIN — LEVOTHYROXINE SODIUM 125 MCG: 75 TABLET ORAL at 08:50

## 2023-11-14 RX ADMIN — DEXTROSE MONOHYDRATE 250 ML: 100 INJECTION, SOLUTION INTRAVENOUS at 09:07

## 2023-11-14 RX ADMIN — INSULIN HUMAN 10 UNITS: 100 INJECTION, SOLUTION PARENTERAL at 09:05

## 2023-11-14 RX ADMIN — SODIUM CHLORIDE, PRESERVATIVE FREE 10 ML: 5 INJECTION INTRAVENOUS at 08:21

## 2023-11-14 RX ADMIN — TAMSULOSIN HYDROCHLORIDE 0.4 MG: 0.4 CAPSULE ORAL at 08:50

## 2023-11-14 RX ADMIN — NOREPINEPHRINE BITARTRATE 2 MCG/MIN: 0.06 INJECTION, SOLUTION INTRAVENOUS at 11:45

## 2023-11-14 RX ADMIN — METOPROLOL TARTRATE 50 MG: 50 TABLET, FILM COATED ORAL at 08:50

## 2023-11-14 ASSESSMENT — PAIN SCALES - GENERAL
PAINLEVEL_OUTOF10: 0
PAINLEVEL_OUTOF10: 0

## 2023-11-14 NOTE — FLOWSHEET NOTE
Report was given to [facility] ICU team.  IABP was transferred to their monitor and appropriate waveforms and pressures were observed. An overview of the IABP settings and hook-up was given. All gtts and patient history of stay was reviewed. Care was transferred to the [facility] ICU team and patient was in good condition, but confused. Vitals were stable with 3mcg/min of levophed. [facility] ICU team was advised to monitor blood sugar levels due to the administration of insulin and dextrose. All questions and concerns were addressed and patient left unit in the care of the [facility] ICU team in stable condition. Report was given to Dread Mohan from [facility]. All questions and concerns were addressed.

## 2023-11-14 NOTE — CONSULTS
Renal Consult Note    Patient :  Dhiraj Russo; 80 y.o. MRN# [id number]  Location:  [id number]  Attending:  Angela Saleem MD  Admit Date:  11/13/2023   Hospital Day: 1    Reason for Consult:     Asked by Dr Angela Saleem MD to see for LINETTE/Elevated Creatinine. History Obtained From:     Patient/chart and staff    History of Present Illness:     Dhiraj Russo; 80 y.o. male with past medical history as mentioned below. He also has known history of type 2 diabetes on metformin, hypertension, COPD, previous history of mitral valve and tricuspid valve repair, chronic kidney disease stage IIIb baseline creatinine 1.5-1.7 follows up with Dr. Gerhard Tolliver in the office. Proteinuria has been 0.3 to 0.5 g range. Ultrasound has shown kidney size to be 10 cm on the right and left 9.6 on the left. Home medicines included metoprolol Hytrin and Demadex. There is also mention about use of Aldactone and metformin. He is on oral anticoagulation as well. He presented to the hospital with symptoms of chest pain. EKG showed ST elevation MI. He was taken emergently to the Cath Lab underwent cardiac catheterization which showed three-vessel disease. LAD was a culprit lesion and could not be wired. CT surgery was consulted who recommended transfer to [facility] for further care. High risk PCI could not be performed here. Interim aortic balloon pump was placed and he was transferred to the ICU. His creatinine presentation was 1.9. Potassium was 5.9. Nephrology was consulted for acute kidney injury and hyperkalemia. He was treated medically with insulin, dextrose and Lokelma. Potassium came down to 4.5 this afternoon. At the time evaluation he denies any complaints. Balloon pump is in place. Transfer to [facility] is in progress.   Labs today showed a sodium of 136 potassium 5.9 chloride 100 bicarb 25 BUN 41 creatinine 1.9 calcium 9 ABG showed pH 7.39 PCO2 43 PO2 94 bicarb 26, hemoglobin 9.2 white count 7.2
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 Patient has CKD 3B along with anemia of unrolled source. He has a history of a low ejection fraction with AICD pacer in place  After evaluation of his cardiac catheterization-the LAD does not have a target for bypass surgery. The RCA does not have a target as it is nondominant. The L PDA and large circumflex look stentable potential stentable OM. Patient is at greater risk going for open heart surgery as there is likely not an LAD target after evaluation with patient he would like to seek other conservative means. He has a strong understanding of his current cardiac catheterization. Evaluate by cardiology for rotoblade second PCI attempt to pass a wire to open up the LAD.   At this time cardiothoracic surgery will sign off    ARELI De Los Santos NP, CNP  Phone: [phone]

## 2023-11-14 NOTE — CARE COORDINATION
11/14/23 0723   Readmission Assessment   Number of Days since last admission? 1-7 days   Previous Disposition Other (comment)  (Transfer from [facility])   Who is being Interviewed Patient   What was the patient's/caregiver's perception as to why they think they needed to return back to the hospital? Other (Comment)  (Transfer from [facility])   Did you visit your Primary Care Physician after you left the hospital, before you returned this time? No   Why weren't you able to visit your PCP? Other (Comment)  (Transfer from [facility])   Did you see a specialist, such as Cardiac, Pulmonary, Orthopedic Physician, etc. after you left the hospital? No   Who advised the patient to return to the hospital? Physician   Does the patient report anything that got in the way of taking their medications? No   In our efforts to provide the best possible care to you and others like you, can you think of anything that we could have done to help you after you left the hospital the first time, so that you might not have needed to return so soon?  Other (Comment)  (Transfer from [facility])

## 2023-11-14 NOTE — CARE COORDINATION
11/14/23 0726   Readmission Assessment   Number of Days since last admission? 1-7 days   Previous Disposition Other (comment)  (Transfer from [facility])   Who is being Interviewed Patient   What was the patient's/caregiver's perception as to why they think they needed to return back to the hospital? Other (Comment)  (Transfer from [facility])   Did you visit your Primary Care Physician after you left the hospital, before you returned this time? No   Why weren't you able to visit your PCP? Other (Comment)  (Transfer from [facility])   Did you see a specialist, such as Cardiac, Pulmonary, Orthopedic Physician, etc. after you left the hospital? No   Who advised the patient to return to the hospital? Physician   Does the patient report anything that got in the way of taking their medications? No   In our efforts to provide the best possible care to you and others like you, can you think of anything that we could have done to help you after you left the hospital the first time, so that you might not have needed to return so soon?  Other (Comment)  (Transfer from [facility])

## 2023-11-14 NOTE — CARE COORDINATION
Pt confused and no family present to complete CM assessment. Face sheet faxed to [facility] [phone]. Dr Dudley Johnson verified via PS that pt to be transferred to [facility]. 36 Per Thom Schraderr, [address] able to accept pt.     1300 Transportation set for [id number] with [employer]. 1310 Attempted to call pt's son Robby Ohara- left  to request CB. Unable to complete CM assessment at this time.

## 2023-11-14 NOTE — DISCHARGE SUMMARY
[facility]  Discharge Note                 Name:  Ciara Kirby  YOB: 1934  Social Security Number:  [id number]  Medical Record Number:  [id number]    Date of Admission:  11/13/2023  Date of Discharge:  11/14/2023    Admitting physician: Humberto Stiles MD    Discharge Attending: Nichole Upton MD, MD  Primary Care Physician: Eliseo Drake DO  Consultants: Cardiology, Nephrology  Discharge to To a non-[facility]    HOSPITAL ADMISSION PROBLEM LIST:  Patient Active Problem List   Diagnosis    Basal cell carcinoma of right lateral cheek    Anemia    Candidal balanitis    Carotid stenosis    Chronic obstructive pulmonary disease (HCC)    Diastolic heart failure (HCC)    Enlarged prostate    Gastro-esophageal reflux disease without esophagitis    History of mitral valve replacement    Hypertension    Hypothyroidism    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) ([address])    Mixed hyperlipidemia    Nicotine dependence    Nonrheumatic mitral valve insufficiency    Non-rheumatic tricuspid valve insufficiency    Obstructive sleep apnea syndrome    Osteoarthrosis    Persistent atrial fibrillation (HCC)    Pleural effusion    Heart valve disease    Pulmonary hypertension ([address])    Shortness of breath    Sinus node dysfunction (HCC)    Stage 3 chronic kidney disease (HCC)    Type 2 diabetes mellitus ([address])    Vitamin D deficiency    Acute systolic heart failure (HCC)    Anxiety    Balanitis    Benign neoplasm of colon    Benign neoplasm of left adrenal gland    Change in bowel habit    Chest pain on breathing    Dependence on other enabling machines and devices    Chronic respiratory insufficiency    Dysphagia    Dysuria    Epigastric pain    Exposure to radon    Fatigue    Flatulence, eructation and gas pain    Fluid overload    Diabetic renal disease (HCC)    Hypomagnesemia    Hypoxemia    Lymphadenopathy    Moderate major depression, single episode (HCC)    Moderate persistent

## 2023-11-14 NOTE — FLOWSHEET NOTE
Updated patient's son regarding transfer to [facility]. Room number was given and an estimated time when the patient would leave was given. All questions and concerns were addressed.

## 2023-11-15 LAB
ECHO AO ROOT DIAM: 3.7 CM
ECHO AO ROOT INDEX: 1.94 CM/M2
ECHO AV AREA PEAK VELOCITY: 1 CM2
ECHO AV AREA VTI: 1 CM2
ECHO AV AREA/BSA PEAK VELOCITY: 0.5 CM2/M2
ECHO AV AREA/BSA VTI: 0.5 CM2/M2
ECHO AV MEAN GRADIENT: 7 MMHG
ECHO AV MEAN VELOCITY: 1.2 M/S
ECHO AV PEAK GRADIENT: 15 MMHG
ECHO AV PEAK VELOCITY: 2 M/S
ECHO AV VELOCITY RATIO: 0.3
ECHO AV VTI: 33.3 CM
ECHO BSA: 1.9 M2
ECHO EST RA PRESSURE: 15 MMHG
ECHO LA AREA 2C: 32.7 CM2
ECHO LA AREA 4C: 33.7 CM2
ECHO LA DIAMETER INDEX: 2.2 CM/M2
ECHO LA DIAMETER: 4.2 CM
ECHO LA MAJOR AXIS: 5.7 CM
ECHO LA MINOR AXIS: 6.2 CM
ECHO LA TO AORTIC ROOT RATIO: 1.14
ECHO LA VOL BP: 159 ML (ref 18–58)
ECHO LA VOL MOD A2C: 145 ML (ref 18–58)
ECHO LA VOL MOD A4C: 168 ML (ref 18–58)
ECHO LA VOL/BSA BIPLANE: 83 ML/M2 (ref 16–34)
ECHO LA VOLUME INDEX MOD A2C: 76 ML/M2 (ref 16–34)
ECHO LA VOLUME INDEX MOD A4C: 88 ML/M2 (ref 16–34)
ECHO LV E' LATERAL VELOCITY: 6 CM/S
ECHO LV E' SEPTAL VELOCITY: 5 CM/S
ECHO LV EDV A4C: 89 ML
ECHO LV EDV INDEX A4C: 47 ML/M2
ECHO LV EJECTION FRACTION A4C: 40 %
ECHO LV ESV A4C: 53 ML
ECHO LV ESV INDEX A4C: 28 ML/M2
ECHO LV FRACTIONAL SHORTENING: 23 % (ref 28–44)
ECHO LV INTERNAL DIMENSION DIASTOLE INDEX: 2.46 CM/M2
ECHO LV INTERNAL DIMENSION DIASTOLIC: 4.7 CM (ref 4.2–5.9)
ECHO LV INTERNAL DIMENSION SYSTOLIC INDEX: 1.88 CM/M2
ECHO LV INTERNAL DIMENSION SYSTOLIC: 3.6 CM
ECHO LV IVSD: 1.1 CM (ref 0.6–1)
ECHO LV MASS 2D: 199.6 G (ref 88–224)
ECHO LV MASS INDEX 2D: 104.5 G/M2 (ref 49–115)
ECHO LV POSTERIOR WALL DIASTOLIC: 1.2 CM (ref 0.6–1)
ECHO LV RELATIVE WALL THICKNESS RATIO: 0.51
ECHO LVOT AREA: 3.1 CM2
ECHO LVOT AV VTI INDEX: 0.33
ECHO LVOT DIAM: 2 CM
ECHO LVOT MEAN GRADIENT: 1 MMHG
ECHO LVOT PEAK GRADIENT: 2 MMHG
ECHO LVOT PEAK VELOCITY: 0.6 M/S
ECHO LVOT STROKE VOLUME INDEX: 18.1 ML/M2
ECHO LVOT SV: 34.5 ML
ECHO LVOT VTI: 11 CM
ECHO MV AREA VTI: 1.1 CM2
ECHO MV LVOT VTI INDEX: 2.95
ECHO MV MAX VELOCITY: 1.5 M/S
ECHO MV MEAN GRADIENT: 2 MMHG
ECHO MV MEAN VELOCITY: 0.7 M/S
ECHO MV PEAK GRADIENT: 9 MMHG
ECHO MV VTI: 32.5 CM
ECHO PV MAX VELOCITY: 0.8 M/S
ECHO PV PEAK GRADIENT: 3 MMHG
ECHO RIGHT VENTRICULAR SYSTOLIC PRESSURE (RVSP): 51 MMHG
ECHO RV BASAL DIMENSION: 6.1 CM
ECHO RV FREE WALL PEAK S': 6 CM/S
ECHO TV MEAN GRADIENT: 2 MMHG
ECHO TV MEAN VELOCITY: 0.6 M/S
ECHO TV PEAK GRADIENT: 7 MMHG
ECHO TV REGURGITANT MAX VELOCITY: 3 M/S
ECHO TV REGURGITANT PEAK GRADIENT: 36 MMHG
ECHO TV VALVE AREA VTI: 1.2 CM2
ECHO TV VTI: 28.8 CM

## 2023-11-17 LAB — ECHO BSA: 1.87 M2

## 2023-11-28 ENCOUNTER — TELEPHONE (OUTPATIENT)
Age: 88
End: 2023-11-28

## 2023-11-28 NOTE — TELEPHONE ENCOUNTER
BERTA Flores   His dad passed away at [facility]  They weren't able to wean him off the vent and life support    [address] and Viki to cancel scripts-DONE      1. Please call [address] [place] [address] to cxl his 02- may need to look in  ecw  2.  Call Trang Flores with [facility] records

## 2023-11-28 NOTE — TELEPHONE ENCOUNTER
Caron Atwood called to inform Dr. Tamika Horvath that Modesto Sample passed away on Sunday 11/26/23. He wanted to ask about cancelling [employer] and picking up oxygen tanks. I Googled a contact number to the meal service and Caron Atwood will try to cancel that. But I told him, I believe we have to call and confirm the passing for the oxygen tanks to be picked up. (If I am incorrect, please let him know so he can get that taken care of, thank you.)    Caron Atwood also was inquiring a way to get all of Heriberto's medical records for him to keep. I do not know [facility] records number so could we call him with that? [facility]:  Ph. [phone]  Fax. [phone]    There was also a [facility] office note I saw but do not know there's either? Please call Caron Atwood with this information and if there are any other questions for him. Thank you.

## 2023-12-01 NOTE — TELEPHONE ENCOUNTER
Spoke with son. 1.He will be calling us back with the name and phone number of the [employer] company    2. He was not able to write down the phone number for medical records at time of call.   He will get that information when he calls us back with [address] Ph: [phone]     [id number] [facility] Ph: [phone] FAX: [phone]

## (undated) DEVICE — GLOVE ORANGE PI 8   MSG9080

## (undated) DEVICE — STANDARD HYPODERMIC NEEDLE,POLYPROPYLENE HUB: Brand: MONOJECT

## (undated) DEVICE — CATH BLLN ANGIO 2.50X15MM SC EUPHORA RX

## (undated) DEVICE — ANGIOGRAPHIC CATHETER: Brand: EXPO™

## (undated) DEVICE — PREMIUM DRY TRAY LF: Brand: MEDLINE INDUSTRIES, INC.

## (undated) DEVICE — MHPB HAND AND FOOT PACK: Brand: MEDLINE INDUSTRIES, INC.

## (undated) DEVICE — SOLUTION IRRIG 1000ML 0.9% SOD CHL USP POUR PLAS BTL

## (undated) DEVICE — SOLUTION SURG PREP ANTIMICROBIAL 4 OZ SKIN WND EXIDINE

## (undated) DEVICE — SURGICAL PROCEDURE TRAY CRD CATH SVMMC

## (undated) DEVICE — RUNTHROUGH NS EXTRA FLOPPY PTCA GUIDEWIRE: Brand: RUNTHROUGH

## (undated) DEVICE — GLOVE ORANGE PI 7 1/2   MSG9075

## (undated) DEVICE — SUTURE PROL SZ 3-0 L18IN NONABSORBABLE BLU L24MM FS-1 3/8 8684G

## (undated) DEVICE — MASTISOL ADHESIVE AMPULE

## (undated) DEVICE — INTRODUCER SHTH 6FR L11CM 0.038IN STD SIDEPRT EXTN 3 W

## (undated) DEVICE — PENCIL ES L3M BTTN SWCH HOLSTER W/ BLDE ELECTRD EDGE

## (undated) DEVICE — DRAPE,REIN 53X77,STERILE: Brand: MEDLINE

## (undated) DEVICE — SUTURE PROL SZ 4-0 L18IN NONABSORBABLE BLU L19MM PS-2 3/8 8682G

## (undated) DEVICE — SOLUTION IV IRRIG POUR BRL 0.9% SODIUM CHL 2F7124

## (undated) DEVICE — CATHETER IABP 8FR 50CC FBROPT CONN W INSRT KT TWO STATLOK

## (undated) DEVICE — CATHETER GUID EXTRA BACKUP 3.5 0.070IN 6FR 100CM VISTA BRITE TIP

## (undated) DEVICE — SYRINGE MED 10ML LUERLOCK TIP W/O SFTY DISP

## (undated) DEVICE — HI-TORQUE CROSS-IT 100XT GUIDE WIRE W/HYDROCOAT HYDROPHILIC COATING .014 STRAIGHT TIP  3.0 CM X 190 CM: Brand: CROSS-IT

## (undated) DEVICE — ELECTRODE PT RET AD L9FT HI MOIST COND ADH HYDRGEL CORDED

## (undated) DEVICE — DRESSING PETRO W3XL3IN OIL EMUL N ADH GZ KNIT IMPREG CELOS

## (undated) DEVICE — GOWN,PREVENTION PLUS,XLN/2XL,ST,22/CS: Brand: MEDLINE